# Patient Record
Sex: MALE | Race: WHITE | NOT HISPANIC OR LATINO | Employment: FULL TIME | URBAN - METROPOLITAN AREA
[De-identification: names, ages, dates, MRNs, and addresses within clinical notes are randomized per-mention and may not be internally consistent; named-entity substitution may affect disease eponyms.]

---

## 2018-07-09 ENCOUNTER — OFFICE VISIT CONVERTED (OUTPATIENT)
Dept: OTOLARYNGOLOGY | Facility: CLINIC | Age: 28
End: 2018-07-09
Attending: OTOLARYNGOLOGY

## 2018-08-20 ENCOUNTER — OFFICE VISIT CONVERTED (OUTPATIENT)
Dept: OTOLARYNGOLOGY | Facility: CLINIC | Age: 28
End: 2018-08-20
Attending: OTOLARYNGOLOGY

## 2018-08-20 ENCOUNTER — CONVERSION ENCOUNTER (OUTPATIENT)
Dept: OTOLARYNGOLOGY | Facility: CLINIC | Age: 28
End: 2018-08-20

## 2018-09-18 ENCOUNTER — OFFICE VISIT CONVERTED (OUTPATIENT)
Dept: GASTROENTEROLOGY | Facility: CLINIC | Age: 28
End: 2018-09-18
Attending: NURSE PRACTITIONER

## 2018-09-18 ENCOUNTER — CONVERSION ENCOUNTER (OUTPATIENT)
Dept: GASTROENTEROLOGY | Facility: CLINIC | Age: 28
End: 2018-09-18

## 2019-01-17 ENCOUNTER — HOSPITAL ENCOUNTER (OUTPATIENT)
Dept: OTHER | Facility: HOSPITAL | Age: 29
Discharge: HOME | End: 2019-01-17

## 2019-01-24 ENCOUNTER — HOSPITAL ENCOUNTER (OUTPATIENT)
Dept: GASTROENTEROLOGY | Facility: HOSPITAL | Age: 29
Setting detail: HOSPITAL OUTPATIENT SURGERY
Discharge: HOME | End: 2019-01-24
Attending: INTERNAL MEDICINE

## 2019-01-24 LAB — GLUCOSE BLD-MCNC: 111 MG/DL (ref 70–99)

## 2019-02-07 ENCOUNTER — HOSPITAL ENCOUNTER (OUTPATIENT)
Dept: OTHER | Facility: HOSPITAL | Age: 29
Discharge: HOME | End: 2019-02-07
Attending: INTERNAL MEDICINE

## 2019-02-07 LAB
APPEARANCE UR: CLEAR
BILIRUB UR QL: NEGATIVE
COLOR UR: YELLOW
CONV COLLECTION SOURCE (UA): NORMAL
CONV UROBILINOGEN IN URINE BY AUTOMATED TEST STRIP: 0.2 {EHRLICHU}/DL (ref 0.1–1)
GLUCOSE UR QL: NEGATIVE MG/DL
HGB UR QL STRIP: NEGATIVE
KETONES UR QL STRIP: NEGATIVE MG/DL
LEUKOCYTE ESTERASE UR QL STRIP: NEGATIVE
NITRITE UR QL STRIP: NEGATIVE
PH UR STRIP.AUTO: 6.5 [PH] (ref 5–8)
PROT UR QL: NEGATIVE MG/DL
SP GR UR: 1.02 (ref 1–1.03)

## 2019-02-10 LAB
CONV CELIAC DISEASE AB-IGA: 230 MG/DL (ref 68–408)
TTG IGA SER-ACNC: 0 U/ML (ref 0–3)

## 2019-02-12 ENCOUNTER — OFFICE VISIT CONVERTED (OUTPATIENT)
Dept: GASTROENTEROLOGY | Facility: CLINIC | Age: 29
End: 2019-02-12
Attending: NURSE PRACTITIONER

## 2019-02-12 ENCOUNTER — CONVERSION ENCOUNTER (OUTPATIENT)
Dept: GASTROENTEROLOGY | Facility: CLINIC | Age: 29
End: 2019-02-12

## 2019-03-01 ENCOUNTER — HOSPITAL ENCOUNTER (OUTPATIENT)
Dept: OTHER | Facility: HOSPITAL | Age: 29
Discharge: HOME | End: 2019-03-01

## 2019-03-01 LAB
ALBUMIN SERPL-MCNC: 4.3 G/DL (ref 3.5–5)
ALBUMIN/GLOB SERPL: 1.3 {RATIO} (ref 1.4–2.6)
ALP SERPL-CCNC: 22 U/L (ref 53–128)
ALT SERPL-CCNC: 24 U/L (ref 10–40)
ANION GAP SERPL CALC-SCNC: 17 MMOL/L (ref 8–19)
APPEARANCE UR: CLEAR
AST SERPL-CCNC: 14 U/L (ref 15–50)
BASOPHILS # BLD AUTO: 0.04 10*3/UL (ref 0–0.2)
BASOPHILS NFR BLD AUTO: 0.5 % (ref 0–3)
BILIRUB SERPL-MCNC: 0.42 MG/DL (ref 0.2–1.3)
BILIRUB UR QL: NEGATIVE
BUN SERPL-MCNC: 14 MG/DL (ref 5–25)
BUN/CREAT SERPL: 13 {RATIO} (ref 6–20)
CALCIUM SERPL-MCNC: 9.7 MG/DL (ref 8.7–10.4)
CHLORIDE SERPL-SCNC: 103 MMOL/L (ref 99–111)
CHOLEST SERPL-MCNC: 136 MG/DL (ref 107–200)
CHOLEST/HDLC SERPL: 3.6 {RATIO} (ref 3–6)
COLOR UR: YELLOW
CONV ABS IMM GRAN: 0.03 10*3/UL (ref 0–0.2)
CONV CO2: 25 MMOL/L (ref 22–32)
CONV COLLECTION SOURCE (UA): NORMAL
CONV CREATININE URINE, RANDOM: 166.9 MG/DL (ref 10–300)
CONV IMMATURE GRAN: 0.4 % (ref 0–1.8)
CONV MICROALBUM.,U,RANDOM: <12 MG/L (ref 0–20)
CONV TOTAL PROTEIN: 7.6 G/DL (ref 6.3–8.2)
CONV UROBILINOGEN IN URINE BY AUTOMATED TEST STRIP: 0.2 {EHRLICHU}/DL (ref 0.1–1)
CREAT UR-MCNC: 1.09 MG/DL (ref 0.7–1.2)
DEPRECATED RDW RBC AUTO: 42.6 FL (ref 35.1–43.9)
EOSINOPHIL # BLD AUTO: 0.09 10*3/UL (ref 0–0.7)
EOSINOPHIL # BLD AUTO: 1.1 % (ref 0–7)
ERYTHROCYTE [DISTWIDTH] IN BLOOD BY AUTOMATED COUNT: 14.6 % (ref 11.6–14.4)
EST. AVERAGE GLUCOSE BLD GHB EST-MCNC: 111 MG/DL
GFR SERPLBLD BASED ON 1.73 SQ M-ARVRAT: >60 ML/MIN/{1.73_M2}
GLOBULIN UR ELPH-MCNC: 3.3 G/DL (ref 2–3.5)
GLUCOSE SERPL-MCNC: 99 MG/DL (ref 70–99)
GLUCOSE UR QL: NEGATIVE MG/DL
HBA1C MFR BLD: 13.5 G/DL (ref 14–18)
HBA1C MFR BLD: 5.5 % (ref 3.5–5.7)
HCT VFR BLD AUTO: 43.4 % (ref 42–52)
HDLC SERPL-MCNC: 38 MG/DL (ref 40–60)
HGB UR QL STRIP: NEGATIVE
KETONES UR QL STRIP: NEGATIVE MG/DL
LDLC SERPL CALC-MCNC: 80 MG/DL (ref 70–100)
LEUKOCYTE ESTERASE UR QL STRIP: NEGATIVE
LYMPHOCYTES # BLD AUTO: 2.57 10*3/UL (ref 1–5)
MCH RBC QN AUTO: 25 PG (ref 27–31)
MCHC RBC AUTO-ENTMCNC: 31.1 G/DL (ref 33–37)
MCV RBC AUTO: 80.5 FL (ref 80–96)
MICROALBUMIN/CREAT UR: 7.2 MG/G{CRE} (ref 0–25)
MONOCYTES # BLD AUTO: 0.58 10*3/UL (ref 0.2–1.2)
MONOCYTES NFR BLD AUTO: 7 % (ref 3–10)
NEUTROPHILS # BLD AUTO: 4.94 10*3/UL (ref 2–8)
NEUTROPHILS NFR BLD AUTO: 59.8 % (ref 30–85)
NITRITE UR QL STRIP: NEGATIVE
NRBC CBCN: 0 % (ref 0–0.7)
OSMOLALITY SERPL CALC.SUM OF ELEC: 293 MOSM/KG (ref 273–304)
PH UR STRIP.AUTO: 6 [PH] (ref 5–8)
PLATELET # BLD AUTO: 252 10*3/UL (ref 130–400)
PMV BLD AUTO: 9.4 FL (ref 9.4–12.4)
POTASSIUM SERPL-SCNC: 4.3 MMOL/L (ref 3.5–5.3)
PROT UR QL: NEGATIVE MG/DL
RBC # BLD AUTO: 5.39 10*6/UL (ref 4.7–6.1)
SODIUM SERPL-SCNC: 141 MMOL/L (ref 135–147)
SP GR UR: 1.02 (ref 1–1.03)
T4 FREE SERPL-MCNC: 1.1 NG/DL (ref 0.9–1.8)
TRIGL SERPL-MCNC: 89 MG/DL (ref 40–150)
TSH SERPL-ACNC: 1.32 M[IU]/L (ref 0.27–4.2)
VARIANT LYMPHS NFR BLD MANUAL: 31.2 % (ref 20–45)
VLDLC SERPL-MCNC: 18 MG/DL (ref 5–37)
WBC # BLD AUTO: 8.25 10*3/UL (ref 4.8–10.8)

## 2019-05-07 ENCOUNTER — HOSPITAL ENCOUNTER (OUTPATIENT)
Dept: ULTRASOUND IMAGING | Facility: HOSPITAL | Age: 29
Discharge: HOME | End: 2019-05-07

## 2019-07-16 ENCOUNTER — HOSPITAL ENCOUNTER (OUTPATIENT)
Dept: OTHER | Facility: HOSPITAL | Age: 29
Discharge: HOME | End: 2019-07-16

## 2019-07-16 LAB
ALBUMIN SERPL-MCNC: 4.3 G/DL (ref 3.5–5)
ALBUMIN/GLOB SERPL: 1.4 {RATIO} (ref 1.4–2.6)
ALP SERPL-CCNC: 22 U/L (ref 53–128)
ALT SERPL-CCNC: 23 U/L (ref 10–40)
ANION GAP SERPL CALC-SCNC: 14 MMOL/L (ref 8–19)
AST SERPL-CCNC: 15 U/L (ref 15–50)
BASOPHILS # BLD AUTO: 0.04 10*3/UL (ref 0–0.2)
BASOPHILS NFR BLD AUTO: 0.4 % (ref 0–3)
BILIRUB SERPL-MCNC: 0.37 MG/DL (ref 0.2–1.3)
BUN SERPL-MCNC: 13 MG/DL (ref 5–25)
BUN/CREAT SERPL: 10 {RATIO} (ref 6–20)
CALCIUM SERPL-MCNC: 9.2 MG/DL (ref 8.7–10.4)
CHLORIDE SERPL-SCNC: 105 MMOL/L (ref 99–111)
CHOLEST SERPL-MCNC: 137 MG/DL (ref 107–200)
CHOLEST/HDLC SERPL: 4.2 {RATIO} (ref 3–6)
CONV ABS IMM GRAN: 0.04 10*3/UL (ref 0–0.2)
CONV CO2: 26 MMOL/L (ref 22–32)
CONV IMMATURE GRAN: 0.4 % (ref 0–1.8)
CONV TOTAL PROTEIN: 7.3 G/DL (ref 6.3–8.2)
CREAT UR-MCNC: 1.33 MG/DL (ref 0.7–1.2)
DEPRECATED RDW RBC AUTO: 44.1 FL (ref 35.1–43.9)
EOSINOPHIL # BLD AUTO: 0.14 10*3/UL (ref 0–0.7)
EOSINOPHIL # BLD AUTO: 1.6 % (ref 0–7)
ERYTHROCYTE [DISTWIDTH] IN BLOOD BY AUTOMATED COUNT: 14.4 % (ref 11.6–14.4)
EST. AVERAGE GLUCOSE BLD GHB EST-MCNC: 108 MG/DL
FERRITIN SERPL-MCNC: 69 NG/ML (ref 30–300)
FOLATE SERPL-MCNC: 9.5 NG/ML (ref 4.8–20)
GFR SERPLBLD BASED ON 1.73 SQ M-ARVRAT: >60 ML/MIN/{1.73_M2}
GLOBULIN UR ELPH-MCNC: 3 G/DL (ref 2–3.5)
GLUCOSE SERPL-MCNC: 121 MG/DL (ref 70–99)
HBA1C MFR BLD: 13.8 G/DL (ref 14–18)
HBA1C MFR BLD: 5.4 % (ref 3.5–5.7)
HCT VFR BLD AUTO: 44.6 % (ref 42–52)
HDLC SERPL-MCNC: 33 MG/DL (ref 40–60)
IRON SATN MFR SERPL: 12 % (ref 20–55)
IRON SERPL-MCNC: 44 UG/DL (ref 70–180)
LDLC SERPL CALC-MCNC: 78 MG/DL (ref 70–100)
LYMPHOCYTES # BLD AUTO: 3.02 10*3/UL (ref 1–5)
MCH RBC QN AUTO: 26.1 PG (ref 27–31)
MCHC RBC AUTO-ENTMCNC: 30.9 G/DL (ref 33–37)
MCV RBC AUTO: 84.3 FL (ref 80–96)
MONOCYTES # BLD AUTO: 0.73 10*3/UL (ref 0.2–1.2)
MONOCYTES NFR BLD AUTO: 8.2 % (ref 3–10)
NEUTROPHILS # BLD AUTO: 4.94 10*3/UL (ref 2–8)
NEUTROPHILS NFR BLD AUTO: 55.5 % (ref 30–85)
NRBC CBCN: 0 % (ref 0–0.7)
OSMOLALITY SERPL CALC.SUM OF ELEC: 291 MOSM/KG (ref 273–304)
PLATELET # BLD AUTO: 287 10*3/UL (ref 130–400)
PMV BLD AUTO: 9.3 FL (ref 9.4–12.4)
POTASSIUM SERPL-SCNC: 5.1 MMOL/L (ref 3.5–5.3)
RBC # BLD AUTO: 5.29 10*6/UL (ref 4.7–6.1)
RETICS # AUTO: 0.07 10*6/UL (ref 0.03–0.1)
RETICS/RBC NFR AUTO: 1.41 % (ref 0.51–1.81)
SODIUM SERPL-SCNC: 140 MMOL/L (ref 135–147)
TIBC SERPL-MCNC: 366 UG/DL (ref 245–450)
TRANSFERRIN SERPL-MCNC: 256 MG/DL (ref 215–365)
TRIGL SERPL-MCNC: 130 MG/DL (ref 40–150)
VARIANT LYMPHS NFR BLD MANUAL: 33.9 % (ref 20–45)
VIT B12 SERPL-MCNC: 523 PG/ML (ref 211–911)
VLDLC SERPL-MCNC: 26 MG/DL (ref 5–37)
WBC # BLD AUTO: 8.91 10*3/UL (ref 4.8–10.8)

## 2019-07-22 ENCOUNTER — HOSPITAL ENCOUNTER (OUTPATIENT)
Dept: ULTRASOUND IMAGING | Facility: HOSPITAL | Age: 29
Discharge: HOME | End: 2019-07-22

## 2019-10-16 ENCOUNTER — HOSPITAL ENCOUNTER (OUTPATIENT)
Dept: OTHER | Facility: HOSPITAL | Age: 29
Discharge: HOME | End: 2019-10-16

## 2019-10-16 LAB
ALBUMIN SERPL-MCNC: 4.2 G/DL (ref 3.5–5)
ALBUMIN/GLOB SERPL: 1.5 {RATIO} (ref 1.4–2.6)
ALP SERPL-CCNC: 21 U/L (ref 53–128)
ALT SERPL-CCNC: 23 U/L (ref 10–40)
ANION GAP SERPL CALC-SCNC: 18 MMOL/L (ref 8–19)
AST SERPL-CCNC: 14 U/L (ref 15–50)
BASOPHILS # BLD AUTO: 0.06 10*3/UL (ref 0–0.2)
BASOPHILS NFR BLD AUTO: 0.8 % (ref 0–3)
BILIRUB SERPL-MCNC: 0.29 MG/DL (ref 0.2–1.3)
BUN SERPL-MCNC: 14 MG/DL (ref 5–25)
BUN/CREAT SERPL: 14 {RATIO} (ref 6–20)
CALCIUM SERPL-MCNC: 9.6 MG/DL (ref 8.7–10.4)
CHLORIDE SERPL-SCNC: 105 MMOL/L (ref 99–111)
CHOLEST SERPL-MCNC: 127 MG/DL (ref 107–200)
CHOLEST/HDLC SERPL: 3.3 {RATIO} (ref 3–6)
CONV ABS IMM GRAN: 0.04 10*3/UL (ref 0–0.2)
CONV CO2: 24 MMOL/L (ref 22–32)
CONV IMMATURE GRAN: 0.5 % (ref 0–1.8)
CONV TOTAL PROTEIN: 7 G/DL (ref 6.3–8.2)
CREAT UR-MCNC: 1.03 MG/DL (ref 0.7–1.2)
DEPRECATED RDW RBC AUTO: 42.8 FL (ref 35.1–43.9)
EOSINOPHIL # BLD AUTO: 0.13 10*3/UL (ref 0–0.7)
EOSINOPHIL # BLD AUTO: 1.6 % (ref 0–7)
ERYTHROCYTE [DISTWIDTH] IN BLOOD BY AUTOMATED COUNT: 13.8 % (ref 11.6–14.4)
EST. AVERAGE GLUCOSE BLD GHB EST-MCNC: 103 MG/DL
FERRITIN SERPL-MCNC: 57 NG/ML (ref 30–300)
FOLATE SERPL-MCNC: 9.8 NG/ML (ref 4.8–20)
GFR SERPLBLD BASED ON 1.73 SQ M-ARVRAT: >60 ML/MIN/{1.73_M2}
GLOBULIN UR ELPH-MCNC: 2.8 G/DL (ref 2–3.5)
GLUCOSE SERPL-MCNC: 95 MG/DL (ref 70–99)
HBA1C MFR BLD: 5.2 % (ref 3.5–5.7)
HCT VFR BLD AUTO: 43.5 % (ref 42–52)
HDLC SERPL-MCNC: 38 MG/DL (ref 40–60)
HGB BLD-MCNC: 13.5 G/DL (ref 14–18)
IRON SATN MFR SERPL: 16 % (ref 20–55)
IRON SERPL-MCNC: 55 UG/DL (ref 70–180)
LDLC SERPL CALC-MCNC: 63 MG/DL (ref 70–100)
LYMPHOCYTES # BLD AUTO: 2.89 10*3/UL (ref 1–5)
LYMPHOCYTES NFR BLD AUTO: 36.4 % (ref 20–45)
MCH RBC QN AUTO: 26.4 PG (ref 27–31)
MCHC RBC AUTO-ENTMCNC: 31 G/DL (ref 33–37)
MCV RBC AUTO: 85 FL (ref 80–96)
MONOCYTES # BLD AUTO: 0.7 10*3/UL (ref 0.2–1.2)
MONOCYTES NFR BLD AUTO: 8.8 % (ref 3–10)
NEUTROPHILS # BLD AUTO: 4.12 10*3/UL (ref 2–8)
NEUTROPHILS NFR BLD AUTO: 51.9 % (ref 30–85)
NRBC CBCN: 0 % (ref 0–0.7)
OSMOLALITY SERPL CALC.SUM OF ELEC: 294 MOSM/KG (ref 273–304)
PLATELET # BLD AUTO: 252 10*3/UL (ref 130–400)
PMV BLD AUTO: 9.7 FL (ref 9.4–12.4)
POTASSIUM SERPL-SCNC: 4.8 MMOL/L (ref 3.5–5.3)
RBC # BLD AUTO: 5.12 10*6/UL (ref 4.7–6.1)
RETICS # AUTO: 0.07 10*6/UL (ref 0.03–0.1)
RETICS/RBC NFR AUTO: 1.45 % (ref 0.51–1.81)
SODIUM SERPL-SCNC: 142 MMOL/L (ref 135–147)
TIBC SERPL-MCNC: 336 UG/DL (ref 245–450)
TRANSFERRIN SERPL-MCNC: 235 MG/DL (ref 215–365)
TRIGL SERPL-MCNC: 132 MG/DL (ref 40–150)
VIT B12 SERPL-MCNC: 600 PG/ML (ref 211–911)
VLDLC SERPL-MCNC: 26 MG/DL (ref 5–37)
WBC # BLD AUTO: 7.94 10*3/UL (ref 4.8–10.8)

## 2019-12-11 ENCOUNTER — HOSPITAL ENCOUNTER (OUTPATIENT)
Dept: MRI IMAGING | Facility: HOSPITAL | Age: 29
Discharge: HOME | End: 2019-12-11

## 2020-01-27 ENCOUNTER — CONVERSION ENCOUNTER (OUTPATIENT)
Dept: NEUROLOGY | Facility: CLINIC | Age: 30
End: 2020-01-27

## 2020-01-27 ENCOUNTER — OFFICE VISIT CONVERTED (OUTPATIENT)
Dept: NEUROSURGERY | Facility: CLINIC | Age: 30
End: 2020-01-27
Attending: PHYSICIAN ASSISTANT

## 2020-07-23 ENCOUNTER — HOSPITAL ENCOUNTER (OUTPATIENT)
Dept: URGENT CARE | Facility: CLINIC | Age: 30
Discharge: HOME | End: 2020-07-23
Attending: NURSE PRACTITIONER

## 2020-07-25 LAB — BACTERIA SPEC AEROBE CULT: NORMAL

## 2021-05-15 VITALS — BODY MASS INDEX: 40.43 KG/M2 | HEART RATE: 76 BPM | HEIGHT: 74 IN | WEIGHT: 315 LBS

## 2021-05-16 VITALS
BODY MASS INDEX: 40.43 KG/M2 | OXYGEN SATURATION: 98 % | TEMPERATURE: 98 F | RESPIRATION RATE: 18 BRPM | WEIGHT: 315 LBS | HEIGHT: 74 IN | HEART RATE: 94 BPM

## 2021-05-16 VITALS — HEIGHT: 74 IN | BODY MASS INDEX: 40.43 KG/M2 | TEMPERATURE: 98.2 F | WEIGHT: 315 LBS

## 2021-05-16 VITALS
TEMPERATURE: 97.8 F | HEIGHT: 74 IN | WEIGHT: 315 LBS | SYSTOLIC BLOOD PRESSURE: 116 MMHG | HEART RATE: 79 BPM | BODY MASS INDEX: 40.43 KG/M2 | DIASTOLIC BLOOD PRESSURE: 68 MMHG

## 2021-05-16 VITALS
DIASTOLIC BLOOD PRESSURE: 83 MMHG | HEIGHT: 74 IN | SYSTOLIC BLOOD PRESSURE: 132 MMHG | WEIGHT: 315 LBS | BODY MASS INDEX: 40.43 KG/M2

## 2021-10-10 ENCOUNTER — APPOINTMENT (OUTPATIENT)
Dept: CT IMAGING | Facility: HOSPITAL | Age: 31
End: 2021-10-10

## 2021-10-10 ENCOUNTER — APPOINTMENT (OUTPATIENT)
Dept: ULTRASOUND IMAGING | Facility: HOSPITAL | Age: 31
End: 2021-10-10

## 2021-10-10 ENCOUNTER — HOSPITAL ENCOUNTER (EMERGENCY)
Facility: HOSPITAL | Age: 31
Discharge: HOME | End: 2021-10-10
Attending: EMERGENCY MEDICINE | Admitting: EMERGENCY MEDICINE

## 2021-10-10 VITALS
DIASTOLIC BLOOD PRESSURE: 54 MMHG | OXYGEN SATURATION: 98 % | SYSTOLIC BLOOD PRESSURE: 101 MMHG | HEIGHT: 74 IN | RESPIRATION RATE: 18 BRPM | BODY MASS INDEX: 40.43 KG/M2 | TEMPERATURE: 98.8 F | WEIGHT: 315 LBS | HEART RATE: 80 BPM

## 2021-10-10 DIAGNOSIS — G89.29 CHRONIC LEFT-SIDED LOW BACK PAIN WITHOUT SCIATICA: Primary | ICD-10-CM

## 2021-10-10 DIAGNOSIS — M54.50 CHRONIC LEFT-SIDED LOW BACK PAIN WITHOUT SCIATICA: Primary | ICD-10-CM

## 2021-10-10 LAB
BILIRUB UR QL STRIP: NEGATIVE
CLARITY UR: CLEAR
COLOR UR: YELLOW
GLUCOSE UR STRIP-MCNC: NEGATIVE MG/DL
HGB UR QL STRIP.AUTO: NEGATIVE
KETONES UR QL STRIP: NEGATIVE
LEUKOCYTE ESTERASE UR QL STRIP.AUTO: NEGATIVE
NITRITE UR QL STRIP: NEGATIVE
PH UR STRIP.AUTO: 5.5 [PH] (ref 5–8)
PROT UR QL STRIP: NEGATIVE
SP GR UR STRIP: >1.03 (ref 1–1.03)
UROBILINOGEN UR QL STRIP: ABNORMAL

## 2021-10-10 PROCEDURE — 81003 URINALYSIS AUTO W/O SCOPE: CPT | Performed by: EMERGENCY MEDICINE

## 2021-10-10 PROCEDURE — 74176 CT ABD & PELVIS W/O CONTRAST: CPT

## 2021-10-10 PROCEDURE — 76870 US EXAM SCROTUM: CPT

## 2021-10-10 PROCEDURE — 99283 EMERGENCY DEPT VISIT LOW MDM: CPT

## 2021-10-10 NOTE — ED PROVIDER NOTES
Time: 7:32 PM EDT  Arrived by: private car  Chief Complaint: GROIN PAIN   History provided by: pt   History is limited by: N/A     History of Present Illness:  Patient is a 31 y.o. year old male that presents to the emergency department with left GROIN PAIN. This started a couple days ago and is still present and constant. Pain is rated a 6.5 out of 10 currently by the pt. Nothing improves or worsens symptoms.     No testicular edema, erythema, or tenderness. Pt denies weakness and fever. He does c/o an increase in his chronic lower back pain. He had a recent epidural injection. No bladder or bowel incontinecne. Pt is unsure if the back pain is radiating to the groin.     History provided by:  Patient    Recently seen: not recently seen in this ED     Patient Care Team  Primary Care Provider: Carlos Law MD    Past Medical History:     Allergies   Allergen Reactions   • Morphine Hives   • Penicillins Hives     Past Medical History:   Diagnosis Date   • Diabetes mellitus (HCC)    • GERD (gastroesophageal reflux disease)    • Hypertension      Past Surgical History:   Procedure Laterality Date   • APPENDECTOMY     • FEMUR SURGERY     • NASAL SEPTUM SURGERY     • TONSILLECTOMY       History reviewed. No pertinent family history.    Home Medications:  Prior to Admission medications    Medication Sig Start Date End Date Taking? Authorizing Provider   albuterol sulfate  (90 Base) MCG/ACT inhaler Inhale 2 puffs Every 4 (Four) Hours As Needed for Wheezing. 6/26/21   Christopher Gardner PA   brompheniramine-pseudoephedrine-DM 30-2-10 MG/5ML syrup Take 10 mL by mouth 4 (Four) Times a Day As Needed for Congestion, Cough or Allergies. 6/26/21   Christopher Gardner PA   Cetirizine HCl (ZyrTEC Allergy) 10 MG capsule Zyrtec    Emergency, Nurse Sergio, RN   exenatide er (Bydureon BCise) 2 MG/0.85ML auto-injector injection Bydureon BCise 2 mg/0.85 mL subcutaneous auto-injector    Emergency, Nurse Sergio, RN   fluticasone (FLONASE)  "50 MCG/ACT nasal spray 2 sprays into the nostril(s) as directed by provider Daily. 6/26/21   Christopher Gardner PA   lisinopril (PRINIVIL,ZESTRIL) 10 MG tablet lisinopril 10 mg tablet   TAKE 1 TABLET BY MOUTH ONCE DAILY FOR 30 DAYS    Emergency, Nurse Epic, RN   metFORMIN ER (GLUCOPHAGE-XR) 500 MG 24 hr tablet metformin  mg tablet,extended release 24 hr    Emergency, Nurse Sergio RN   multivitamin (THERAGRAN) tablet tablet multivitamin oral tablet take 1 tablet by oral route daily   Active    Emergency, Nurse Sergio RN   omeprazole (priLOSEC) 20 MG capsule     Emergency, Nurse Sergio RN   omeprazole (priLOSEC) 20 MG capsule omeprazole 20 mg capsule,delayed release   TAKE 1 CAPSULE BY MOUTH ONCE DAILY FOR 30 DAYS    Emergency, Nurse Epic, RN   omeprazole OTC (PriLOSEC OTC) 20 MG EC tablet omeprazole 20 mg oral tablet,delayed release (DR/EC) take 1 tablet by oral route daily   Active    Emergency, Nurse AMADOR Hill        Social History:   Social History     Tobacco Use   • Smoking status: Never Smoker   • Smokeless tobacco: Current User     Types: Chew   Vaping Use   • Vaping Use: Never used   Substance Use Topics   • Alcohol use: Yes     Comment: occ   • Drug use: Never     Recent travel: no     Review of Systems:  Review of Systems   Constitutional: Negative for chills, diaphoresis and fever.   HENT: Negative for ear discharge and nosebleeds.    Eyes: Negative for photophobia.   Respiratory: Negative for shortness of breath.    Cardiovascular: Negative for chest pain.   Gastrointestinal: Negative for diarrhea, nausea and vomiting.   Genitourinary: Negative for dysuria.        Groin pain.    Musculoskeletal: Positive for back pain (low back). Negative for neck pain.   Skin: Negative for rash.   Neurological: Negative for headaches.        Physical Exam:  /54 (BP Location: Left arm, Patient Position: Lying)   Pulse 80   Temp 98.8 °F (37.1 °C) (Oral)   Resp 18   Ht 188 cm (74\")   Wt (!) 145 kg (319 lb 3.6 " oz)   SpO2 98%   BMI 40.99 kg/m²     Physical Exam  Vitals and nursing note reviewed.   Constitutional:       General: He is not in acute distress.     Appearance: Normal appearance.   HENT:      Head: Normocephalic and atraumatic.      Nose: Nose normal.   Eyes:      General: No scleral icterus.  Cardiovascular:      Rate and Rhythm: Normal rate and regular rhythm.      Heart sounds: Normal heart sounds.   Pulmonary:      Effort: Pulmonary effort is normal. No respiratory distress.      Breath sounds: Normal breath sounds.   Abdominal:      Palpations: Abdomen is soft.      Tenderness: There is no abdominal tenderness.      Hernia: There is no hernia in the left inguinal area or right inguinal area.   Genitourinary:     Penis: Circumcised.       Testes:         Right: Swelling not present.         Left: Tenderness or swelling not present.   Musculoskeletal:         General: Normal range of motion.      Cervical back: Neck supple.      Right lower leg: No edema.      Left lower leg: No edema.   Skin:     General: Skin is warm and dry.   Neurological:      General: No focal deficit present.      Mental Status: He is alert and oriented to person, place, and time.      Sensory: No sensory deficit.      Motor: No weakness.                Medications in the Emergency Department:  Medications - No data to display     Labs  Lab Results (last 24 hours)     Procedure Component Value Units Date/Time    Urinalysis With Microscopic If Indicated (No Culture) - Urine, Clean Catch [[id number]]  (Abnormal) Collected: 10/10/21 1852    Specimen: Urine, Clean Catch Updated: 10/10/21 1910     Color, UA Yellow     Appearance, UA Clear     pH, UA 5.5     Specific Gravity, UA >1.030     Glucose, UA Negative     Ketones, UA Negative     Bilirubin, UA Negative     Blood, UA Negative     Protein, UA Negative     Leuk Esterase, UA Negative     Nitrite, UA Negative     Urobilinogen, UA 1.0 E.U./dL    Narrative:      Urine microscopic not  indicated.           Imaging:  CT Abdomen Pelvis Without Contrast    Result Date: 10/10/2021  PROCEDURE: CT ABDOMEN PELVIS WO CONTRAST  COMPARISON: [facility], CT, ABDMEN/PELVIS WITH CONTRAST, 12/29/2012, 9:34.  INDICATIONS: Left flank and groin pain  TECHNIQUE: CT images were created without intravenous contrast.   PROTOCOL:   Standard imaging protocol performed    RADIATION:   DLP: 1426.7 mGy*cm   Automated exposure control was utilized to minimize radiation dose.  FINDINGS:  Lung bases are without consolidation.  No pericardial effusion or pleural effusion.  Lack of contrast limits assessment of abdominal organs and vasculature.  The liver and spleen are normal in size and contour.  No pericholecystic inflammation.  Normal adrenal glands.  Pancreas without findings of pancreatitis.  The kidneys are symmetric in size.  There is no obstructing renal or ureteral calculus.  Urinary bladder is thin-walled.  Prostate normal in size.  Negative for pneumoperitoneum.  No bowel obstruction.  Scattered colonic diverticulosis without diverticulitis.  Appendix appears absent.  Abdominal aorta without aneurysm.  No aggressive osseous lesion or acute fracture.  Facet arthropathy in the lower lumbar spine at L4-5 and L5-S1.  Evidence of prior intramedullary nail fixation of the right proximal femur status post removal.  Degenerative findings in the lumbar spine at L3-4, L4-5 and L5-S1 better assessed on prior MRI.  CONCLUSION:  1. No acute abnormality in the abdomen or pelvis. 2. Chronic findings above.     CRISTINO MARRERO MD       Electronically Signed and Approved By: CRISTINO MARRERO MD on 10/10/2021 at 21:37             US Scrotum & Testicles    Result Date: 10/10/2021  PROCEDURE: US SCROTUM AND TESTICLES  COMPARISON: None  INDICATIONS: Testicular/Scrotal Pain  TECHNIQUE: The scrotum and testicles were evaluated with gray scale and color duplex Doppler sonography.   FINDINGS:  The right testicle measures 2.6 x 3.6 x  5.1 cm.  There is normal Doppler flow in the right testicle.  No testicular mass on the right.  The right epididymal head measures 1.3 cm.  The left testicle measures 2.5 x 3.4 x 4.6 cm.  Normal Doppler flow in the left testicle.  No testicular mass in the left.  The left epididymal head measures 1.2 cm.  No significant hydrocele.  No varicocele.   CONCLUSION:  1. Negative for torsion or testicular mass. 2. No acute sonographic abnormality.      CRISTINO MARRERO MD       Electronically Signed and Approved By: CRISTINO MARRERO MD on 10/10/2021 at 20:10               Procedures:  Procedures    Progress                            Medical Decision Making:  MDM  Number of Diagnoses or Management Options     Amount and/or Complexity of Data Reviewed  Clinical lab tests: reviewed  Tests in the radiology section of CPT®: reviewed  Independent visualization of images, tracings, or specimens: yes    Risk of Complications, Morbidity, and/or Mortality  Presenting problems: moderate  Management options: low         Final diagnoses:   Chronic left-sided low back pain without sciatica        Disposition:  ED Disposition     ED Disposition Condition Comment    Discharge Stable           This medical record created using voice recognition software and may contain unintended errors.    Documentation assistance provided by Dary Reis acting as scribe for Maxwell Gillis MD. Information recorded by the scribe was done at my direction and has been verified and validated by me.        Dary Reis  10/10/21 1940       Maxwell Gillis MD  10/10/21 3261

## 2021-10-11 NOTE — DISCHARGE INSTRUCTIONS
As we discussed, return to the emergency department for worsening pain, fever, lower extremity weakness or loss of sensation, loss of control of your bowel or bladder.

## 2022-02-07 ENCOUNTER — OFFICE VISIT (OUTPATIENT)
Dept: FAMILY MEDICINE CLINIC | Facility: CLINIC | Age: 32
End: 2022-02-07

## 2022-02-07 ENCOUNTER — OFFICE VISIT (OUTPATIENT)
Dept: UROLOGY | Facility: CLINIC | Age: 32
End: 2022-02-07

## 2022-02-07 VITALS
WEIGHT: 315 LBS | DIASTOLIC BLOOD PRESSURE: 59 MMHG | BODY MASS INDEX: 40.43 KG/M2 | SYSTOLIC BLOOD PRESSURE: 138 MMHG | TEMPERATURE: 98.4 F | HEIGHT: 74 IN | HEART RATE: 118 BPM

## 2022-02-07 VITALS
OXYGEN SATURATION: 97 % | SYSTOLIC BLOOD PRESSURE: 122 MMHG | DIASTOLIC BLOOD PRESSURE: 69 MMHG | BODY MASS INDEX: 40.43 KG/M2 | HEART RATE: 97 BPM | HEIGHT: 74 IN | WEIGHT: 315 LBS

## 2022-02-07 DIAGNOSIS — R14.2 BELCHING: ICD-10-CM

## 2022-02-07 DIAGNOSIS — J30.9 ALLERGIC RHINITIS, UNSPECIFIED SEASONALITY, UNSPECIFIED TRIGGER: ICD-10-CM

## 2022-02-07 DIAGNOSIS — Z13.220 LIPID SCREENING: ICD-10-CM

## 2022-02-07 DIAGNOSIS — Z76.89 ESTABLISHING CARE WITH NEW DOCTOR, ENCOUNTER FOR: Primary | ICD-10-CM

## 2022-02-07 DIAGNOSIS — Z30.09 STERILIZATION CONSULT: Primary | ICD-10-CM

## 2022-02-07 DIAGNOSIS — Z13.29 SCREENING FOR THYROID DISORDER: ICD-10-CM

## 2022-02-07 DIAGNOSIS — Z11.59 NEED FOR HEPATITIS C SCREENING TEST: ICD-10-CM

## 2022-02-07 DIAGNOSIS — R10.13 EPIGASTRIC PAIN: ICD-10-CM

## 2022-02-07 DIAGNOSIS — E11.9 TYPE 2 DIABETES MELLITUS WITHOUT COMPLICATION, WITHOUT LONG-TERM CURRENT USE OF INSULIN: ICD-10-CM

## 2022-02-07 DIAGNOSIS — I10 ESSENTIAL HYPERTENSION: ICD-10-CM

## 2022-02-07 DIAGNOSIS — K21.9 GASTROESOPHAGEAL REFLUX DISEASE WITHOUT ESOPHAGITIS: ICD-10-CM

## 2022-02-07 LAB
BILIRUB BLD-MCNC: NEGATIVE MG/DL
CLARITY, POC: CLEAR
COLOR UR: YELLOW
GLUCOSE UR STRIP-MCNC: NEGATIVE MG/DL
KETONES UR QL: NEGATIVE
LEUKOCYTE EST, POC: NEGATIVE
NITRITE UR-MCNC: NEGATIVE MG/ML
PH UR: 5.5 [PH] (ref 5–8)
PROT UR STRIP-MCNC: NEGATIVE MG/DL
RBC # UR STRIP: NEGATIVE /UL
SP GR UR: 1.02 (ref 1–1.03)
UROBILINOGEN UR QL: NORMAL

## 2022-02-07 PROCEDURE — 99212 OFFICE O/P EST SF 10 MIN: CPT | Performed by: NURSE PRACTITIONER

## 2022-02-07 PROCEDURE — 99204 OFFICE O/P NEW MOD 45 MIN: CPT | Performed by: NURSE PRACTITIONER

## 2022-02-07 PROCEDURE — 81002 URINALYSIS NONAUTO W/O SCOPE: CPT | Performed by: NURSE PRACTITIONER

## 2022-02-07 RX ORDER — OMEPRAZOLE 20 MG/1
20 CAPSULE, DELAYED RELEASE ORAL DAILY
COMMUNITY
Start: 2022-01-29 | End: 2022-02-22 | Stop reason: SDUPTHER

## 2022-02-07 RX ORDER — ALUMINUM CHLORIDE 20 %
SOLUTION, NON-ORAL TOPICAL
COMMUNITY
Start: 2022-01-23 | End: 2022-03-11

## 2022-02-07 RX ORDER — FAMOTIDINE 20 MG/1
20 TABLET, FILM COATED ORAL DAILY PRN
COMMUNITY
Start: 2022-01-24 | End: 2022-03-11

## 2022-02-07 RX ORDER — LEVOCETIRIZINE DIHYDROCHLORIDE 5 MG/1
5 TABLET, FILM COATED ORAL EVERY EVENING
COMMUNITY
End: 2022-02-07

## 2022-02-07 NOTE — PROGRESS NOTES
Chief Complaint  Establish care  Diabetes, GERD, Allergic rhinitis  Subjective          Abdiel Rico Terrell presents to [facility]  History of Present Illness    Pt is c/o significant belching and burping since December. He started the Ozempic in October and the symptoms started in December. Pt states he is experiencing a lot of gas and epigastric pain. Pt does c/o nausea, worse after meals. Pt states he had CT scan a couple weeks ago which was normal. Pt does take omeprazole daily and was recently given famotidine as an add on medication and states that has helped. He took for a couple days and then his symptoms subsided and his symptoms have much improved.  He states he had a CAT scan done of his abdomen and pelvis about 2 weeks ago at [facility].  We have called to obtain that report.  He states his previous provider told him that everything was normal.  That is the only recent imaging he has had regarding this.  He does see Dr. Alston and has an appointment with him in March.  He does have a known hiatal hernia from a prior EGD.    Diabetes-patient currently on Ozempic and Metformin.  His last A1c in October was 6.1.    Pretension-currently on lisinopril 10 mg and doing well with this, blood pressures well controlled.  Patient denies any headache dizziness edema.    Allergic rhinitis-patient currently on Xyzal daily with good control of allergy symptoms.    Past Medical History:   Diagnosis Date   • Diabetes mellitus (HCC)    • GERD (gastroesophageal reflux disease)    • Hypertension          Allergies   Allergen Reactions   • Morphine Hives   • Penicillins Hives          Past Surgical History:   Procedure Laterality Date   • APPENDECTOMY     • FEMUR SURGERY     • NASAL SEPTUM SURGERY     • TONSILLECTOMY            Social History     Tobacco Use   • Smoking status: Former Smoker     Packs/day: 0.50     Types: Cigarettes     Start date: 2004     Quit date: 2/1/2017     Years  since quittin.0   • Smokeless tobacco: Current User     Types: Chew   Substance Use Topics   • Alcohol use: Yes     Comment: occ         History reviewed. No pertinent family history.       Current Outpatient Medications on File Prior to Visit   Medication Sig   • albuterol sulfate  (90 Base) MCG/ACT inhaler Inhale 2 puffs Every 4 (Four) Hours As Needed for Wheezing.   • Drysol 20 % external solution APPLY SOLUTION ONCE DAILY AT BEDTIME FOR 30 DAYS   • famotidine (PEPCID) 20 MG tablet Take 20 mg by mouth Daily As Needed.   • levocetirizine (XYZAL) 5 MG tablet Take 5 mg by mouth Every Evening.   • lisinopril (PRINIVIL,ZESTRIL) 10 MG tablet lisinopril 10 mg tablet   TAKE 1 TABLET BY MOUTH ONCE DAILY FOR 30 DAYS   • metFORMIN ER (GLUCOPHAGE-XR) 500 MG 24 hr tablet Take 500 mg by mouth 2 (Two) Times a Day.   • multivitamin (THERAGRAN) tablet tablet multivitamin oral tablet take 1 tablet by oral route daily   Active   • omeprazole (priLOSEC) 20 MG capsule Take 20 mg by mouth Daily.   • Semaglutide,0.25 or 0.5MG/DOS, (Ozempic, 0.25 or 0.5 MG/DOSE,) 2 MG/1.5ML solution pen-injector Ozempic 0.25 mg or 0.5 mg (2 mg/1.5 mL) subcutaneous pen injector   • [DISCONTINUED] Cetirizine HCl (ZyrTEC Allergy) 10 MG capsule Zyrtec   • [DISCONTINUED] omeprazole OTC (PriLOSEC OTC) 20 MG EC tablet omeprazole 20 mg oral tablet,delayed release (DR/EC) take 1 tablet by oral route daily   Active     No current facility-administered medications on file prior to visit.         Immunization History   Administered Date(s) Administered   • COVID-19 (PFIZER) PURPLE CAP 2021, 2021   • Flu Vaccine Quad PF >36MO 2017, 2018, 10/12/2019   • Flublok 18+yrs 10/12/2020   • Hepatitis A 2018, 2018   • Influenza Quad Vaccine (Inpatient) 10/02/2016   • MMR 2000   • Pneumococcal Polysaccharide (PPSV23) 2017   • Td 2003   • Tdap 10/04/2016   • flucelvax quad pfs =>4 YRS 2021         /69   " Pulse 97   Ht 188 cm (74\")   Wt (!) 145 kg (320 lb 6.4 oz)   SpO2 97%   BMI 41.14 kg/m²             Physical Exam  Vitals reviewed.   Constitutional:       Appearance: Normal appearance. He is well-developed.   HENT:      Head: Normocephalic and atraumatic.      Right Ear: External ear normal.      Left Ear: External ear normal.      Mouth/Throat:      Pharynx: No oropharyngeal exudate.   Eyes:      Conjunctiva/sclera: Conjunctivae normal.      Pupils: Pupils are equal, round, and reactive to light.   Cardiovascular:      Rate and Rhythm: Normal rate and regular rhythm.      Heart sounds: No murmur heard.  No friction rub. No gallop.    Pulmonary:      Effort: Pulmonary effort is normal.      Breath sounds: Normal breath sounds. No wheezing or rhonchi.   Abdominal:      Tenderness: There is abdominal tenderness in the epigastric area.   Skin:     General: Skin is warm and dry.   Neurological:      Mental Status: He is alert and oriented to person, place, and time.      Cranial Nerves: No cranial nerve deficit.   Psychiatric:         Mood and Affect: Mood and affect normal.         Behavior: Behavior normal.         Thought Content: Thought content normal.         Judgment: Judgment normal.             Result Review :                           Assessment and Plan      Diagnoses and all orders for this visit:    1. Establishing care with new doctor, encounter for (Primary)    2. Need for hepatitis C screening test  -     Hepatitis C antibody; Future    3. Type 2 diabetes mellitus without complication, without long-term current use of insulin (HCC)  Comments:  Well-controlled, continue current medications.  Orders:  -     Comprehensive Metabolic Panel; Future  -     Hemoglobin A1c; Future  -     Microalbumin / Creatinine Urine Ratio - Urine, Clean Catch; Future    4. Essential hypertension  Comments:  Well-controlled, continue daily lisinopril.    5. Gastroesophageal reflux disease without " esophagitis  Comments:  Continue omeprazole daily we will check DISIDA scan.  Orders:  -     NM HIDA Scan With Pharmacological Intervention; Future    6. Belching  -     Helicobacter Pylori, IgA IgG IgM; Future  -     Amylase; Future  -     Lipase; Future  -     CBC w AUTO Differential; Future  -     NM HIDA Scan With Pharmacological Intervention; Future    7. Epigastric pain  -     Helicobacter Pylori, IgA IgG IgM; Future  -     Amylase; Future  -     Lipase; Future  -     CBC w AUTO Differential; Future  -     NM HIDA Scan With Pharmacological Intervention; Future    8. Lipid screening  -     Lipid Panel; Future    9. Screening for thyroid disorder  -     TSH; Future    10. Allergic rhinitis, unspecified seasonality, unspecified trigger  Comments:  Symptoms controlled with daily Xyzal, continue current medication.              Follow Up     Return in about 3 months (around 5/7/2022), or if symptoms worsen or fail to improve.    Patient was given instructions and counseling regarding his condition or for health maintenance advice. Please see specific information pulled into the AVS if appropriate.

## 2022-02-07 NOTE — PROGRESS NOTES
"Chief Complaint  Sterilization (consult )    Subjective          Abdiel Terrell 31 y.o.male presents to [facility]  History of Present Illness  The patient is a consultation from self, for vasectomy counseling. Prior  surgeries have not been performed .He  and has 1 biological child with his spouse. They do not want anymore children.     The patient is counseled regarding a no scalpel vasectomy. Key points are that it should be considered irreversible even though it can be reversed, there are risks including failure to achieve sterility, recanalization, bleeding, testicular swelling and/or pain, formation of a sperm granuloma and infection. Limitations of activity post-procedure were discussed and he understands that he is not sterile immediately following the procedure. He will need to bring a semen specimen back in about 6-8 weeks to confirm the abscence of sperm and needs to use contraception until then. Patient understands this is considered an irreversible procedure and wishes have performed. Denies any urological problems or bleeding disorders.     Sruthi Pelayo, APRN  02/07/2022       Review of Systems   Constitutional: Negative for chills and fever.   Respiratory: Negative for cough.    Genitourinary: Negative for difficulty urinating and testicular pain.   Musculoskeletal: Negative for back pain.      Objective   Vital Signs:   /59   Pulse 118   Temp 98.4 °F (36.9 °C)   Ht 188 cm (74\")   Wt (!) 145 kg (320 lb)   BMI 41.09 kg/m²     Physical Exam  Vitals and nursing note reviewed.   Constitutional:       General: He is not in acute distress.     Appearance: Normal appearance. He is well-developed, well-groomed and overweight. He is not ill-appearing.      Comments: Ambulates without difficulty   Cardiovascular:      Rate and Rhythm: Normal rate and regular rhythm.   Pulmonary:      Effort: Pulmonary effort is normal.      Breath sounds: Normal breath sounds " and air entry.   Genitourinary:     Testes:         Right: Mass or tenderness not present.         Left: Mass or tenderness not present.      Epididymis:      Right: Not inflamed. No mass or tenderness.      Left: Not inflamed. No mass or tenderness.      Comments: Questionable small right hydrocele  Musculoskeletal:         General: Normal range of motion.   Skin:     General: Skin is warm and dry.   Neurological:      Mental Status: He is alert and oriented to person, place, and time.      Motor: Motor function is intact.   Psychiatric:         Mood and Affect: Mood normal.         Behavior: Behavior normal. Behavior is cooperative.         Thought Content: Thought content normal.         Judgment: Judgment normal.        Result Review :        POC Urinalysis Dipstick (02/07/2022 14:12)              Assessment and Plan    Diagnoses and all orders for this visit:    1. Sterilization consult (Primary)  -     Vasectomy; Future  -     POC Urinalysis Dipstick        Follow Up   Return in about 2 weeks (around 2/21/2022) for vasectomy per dr schulte when available..  Patient was given instructions and counseling regarding his condition or for health maintenance advice. Please see specific information pulled into the AVS if appropriate. Instructions    • The patient is to go immediately home and lie down flat on his back with an ice pack on the scrotum. He may shower in the morning but no immersion in water for 4 days. He is to avoid strenuous activity for 3 days and straddle activity for 3 weeks. He is reminded that he is not yet sterile and must use contraception until we confirm he no longer has sperm in a semen specimen to be brought to the office in 6 weeks. He is to call for problems.  • The patient will schedule a vasectomy if he desires  to proceed.  • Handouts were provided.  • Electronically Identified Patient Education Materials provided.    All risks, benefits and alternatives to vasectomy discussed and  understood. Understanding that this is considered an irreversible procedure. Written consent was obtained. Verbal and written instructions and information were provided. Schedule procedure when patient desires.    Sruthi Pelayo, APRN

## 2022-02-15 ENCOUNTER — LAB (OUTPATIENT)
Dept: LAB | Facility: HOSPITAL | Age: 32
End: 2022-02-15

## 2022-02-15 DIAGNOSIS — R14.2 BELCHING: ICD-10-CM

## 2022-02-15 DIAGNOSIS — R10.13 EPIGASTRIC PAIN: ICD-10-CM

## 2022-02-15 DIAGNOSIS — E11.9 TYPE 2 DIABETES MELLITUS WITHOUT COMPLICATION, WITHOUT LONG-TERM CURRENT USE OF INSULIN: ICD-10-CM

## 2022-02-15 DIAGNOSIS — Z11.59 NEED FOR HEPATITIS C SCREENING TEST: ICD-10-CM

## 2022-02-15 DIAGNOSIS — Z13.29 SCREENING FOR THYROID DISORDER: ICD-10-CM

## 2022-02-15 DIAGNOSIS — Z13.220 LIPID SCREENING: ICD-10-CM

## 2022-02-15 LAB
ALBUMIN SERPL-MCNC: 4.2 G/DL (ref 3.5–5.2)
ALBUMIN UR-MCNC: 1.4 MG/DL
ALBUMIN/GLOB SERPL: 1.4 G/DL
ALP SERPL-CCNC: 24 U/L (ref 39–117)
ALT SERPL W P-5'-P-CCNC: 33 U/L (ref 1–41)
AMYLASE SERPL-CCNC: 68 U/L (ref 28–100)
ANION GAP SERPL CALCULATED.3IONS-SCNC: 11 MMOL/L (ref 5–15)
AST SERPL-CCNC: 22 U/L (ref 1–40)
BASOPHILS # BLD AUTO: 0.05 10*3/MM3 (ref 0–0.2)
BASOPHILS NFR BLD AUTO: 0.7 % (ref 0–1.5)
BILIRUB SERPL-MCNC: 0.3 MG/DL (ref 0–1.2)
BUN SERPL-MCNC: 9 MG/DL (ref 6–20)
BUN/CREAT SERPL: 9 (ref 7–25)
CALCIUM SPEC-SCNC: 9.2 MG/DL (ref 8.6–10.5)
CHLORIDE SERPL-SCNC: 106 MMOL/L (ref 98–107)
CHOLEST SERPL-MCNC: 130 MG/DL (ref 0–200)
CO2 SERPL-SCNC: 23 MMOL/L (ref 22–29)
CREAT SERPL-MCNC: 1 MG/DL (ref 0.76–1.27)
CREAT UR-MCNC: 237.2 MG/DL
DEPRECATED RDW RBC AUTO: 41 FL (ref 37–54)
EOSINOPHIL # BLD AUTO: 0.16 10*3/MM3 (ref 0–0.4)
EOSINOPHIL NFR BLD AUTO: 2.2 % (ref 0.3–6.2)
ERYTHROCYTE [DISTWIDTH] IN BLOOD BY AUTOMATED COUNT: 13.9 % (ref 12.3–15.4)
GFR SERPL CREATININE-BSD FRML MDRD: 87 ML/MIN/1.73
GLOBULIN UR ELPH-MCNC: 2.9 GM/DL
GLUCOSE SERPL-MCNC: 104 MG/DL (ref 65–99)
HBA1C MFR BLD: 5.8 % (ref 4.8–5.6)
HCT VFR BLD AUTO: 42.9 % (ref 37.5–51)
HCV AB SER DONR QL: NORMAL
HDLC SERPL-MCNC: 32 MG/DL (ref 40–60)
HGB BLD-MCNC: 13.9 G/DL (ref 13–17.7)
IMM GRANULOCYTES # BLD AUTO: 0.05 10*3/MM3 (ref 0–0.05)
IMM GRANULOCYTES NFR BLD AUTO: 0.7 % (ref 0–0.5)
LDLC SERPL CALC-MCNC: 68 MG/DL (ref 0–100)
LDLC/HDLC SERPL: 1.95 {RATIO}
LIPASE SERPL-CCNC: 51 U/L (ref 13–60)
LYMPHOCYTES # BLD AUTO: 2.38 10*3/MM3 (ref 0.7–3.1)
LYMPHOCYTES NFR BLD AUTO: 32.2 % (ref 19.6–45.3)
MCH RBC QN AUTO: 26.7 PG (ref 26.6–33)
MCHC RBC AUTO-ENTMCNC: 32.4 G/DL (ref 31.5–35.7)
MCV RBC AUTO: 82.3 FL (ref 79–97)
MICROALBUMIN/CREAT UR: 5.9 MG/G
MONOCYTES # BLD AUTO: 0.56 10*3/MM3 (ref 0.1–0.9)
MONOCYTES NFR BLD AUTO: 7.6 % (ref 5–12)
NEUTROPHILS NFR BLD AUTO: 4.19 10*3/MM3 (ref 1.7–7)
NEUTROPHILS NFR BLD AUTO: 56.6 % (ref 42.7–76)
NRBC BLD AUTO-RTO: 0 /100 WBC (ref 0–0.2)
PLATELET # BLD AUTO: 270 10*3/MM3 (ref 140–450)
PMV BLD AUTO: 9.5 FL (ref 6–12)
POTASSIUM SERPL-SCNC: 4 MMOL/L (ref 3.5–5.2)
PROT SERPL-MCNC: 7.1 G/DL (ref 6–8.5)
RBC # BLD AUTO: 5.21 10*6/MM3 (ref 4.14–5.8)
SODIUM SERPL-SCNC: 140 MMOL/L (ref 136–145)
TRIGL SERPL-MCNC: 178 MG/DL (ref 0–150)
TSH SERPL DL<=0.05 MIU/L-ACNC: 0.77 UIU/ML (ref 0.27–4.2)
VLDLC SERPL-MCNC: 30 MG/DL (ref 5–40)
WBC NRBC COR # BLD: 7.39 10*3/MM3 (ref 3.4–10.8)

## 2022-02-15 PROCEDURE — 82570 ASSAY OF URINE CREATININE: CPT

## 2022-02-15 PROCEDURE — 80050 GENERAL HEALTH PANEL: CPT

## 2022-02-15 PROCEDURE — 86677 HELICOBACTER PYLORI ANTIBODY: CPT

## 2022-02-15 PROCEDURE — 86803 HEPATITIS C AB TEST: CPT

## 2022-02-15 PROCEDURE — 83690 ASSAY OF LIPASE: CPT

## 2022-02-15 PROCEDURE — 83036 HEMOGLOBIN GLYCOSYLATED A1C: CPT

## 2022-02-15 PROCEDURE — 80061 LIPID PANEL: CPT

## 2022-02-15 PROCEDURE — 36415 COLL VENOUS BLD VENIPUNCTURE: CPT

## 2022-02-15 PROCEDURE — 82150 ASSAY OF AMYLASE: CPT

## 2022-02-15 PROCEDURE — 82043 UR ALBUMIN QUANTITATIVE: CPT

## 2022-02-16 LAB
H PYLORI IGA SER-ACNC: 13 UNITS (ref 0–8.9)
H PYLORI IGG SER IA-ACNC: 0.23 INDEX VALUE (ref 0–0.79)
H PYLORI IGM SER-ACNC: <9 UNITS (ref 0–8.9)

## 2022-02-16 RX ORDER — CLARITHROMYCIN 500 MG/1
500 TABLET, COATED ORAL 2 TIMES DAILY
Qty: 28 TABLET | Refills: 0 | Status: SHIPPED | OUTPATIENT
Start: 2022-02-16 | End: 2022-03-07

## 2022-02-16 RX ORDER — METRONIDAZOLE 500 MG/1
500 TABLET ORAL 3 TIMES DAILY
Qty: 28 TABLET | Refills: 0 | Status: SHIPPED | OUTPATIENT
Start: 2022-02-16 | End: 2022-03-07

## 2022-02-17 ENCOUNTER — TELEPHONE (OUTPATIENT)
Dept: FAMILY MEDICINE CLINIC | Facility: CLINIC | Age: 32
End: 2022-02-17

## 2022-02-17 NOTE — TELEPHONE ENCOUNTER
----- Message from PERRI Reynaga sent at 2/16/2022  3:23 PM EST -----  Positive H. Pylori. Pt needs to increase the omperazole to BID use. Start Clarithromycin 500mg bid and metronidazole 500mg bid, all three medications for 14 days, ensure follow up appt with me in 6-8 weeks. I have sent in prescriptions.

## 2022-02-17 NOTE — TELEPHONE ENCOUNTER
----- Message from PERRI Reynaga sent at 2/16/2022  3:26 PM EST -----  triglycerides elevated, tighter diet control and weight loss encouraged., Hemoglobin A1c slightly elevated, tighter diet control. Will recheck in 6 months.

## 2022-02-18 ENCOUNTER — PATIENT ROUNDING (BHMG ONLY) (OUTPATIENT)
Dept: UROLOGY | Facility: CLINIC | Age: 32
End: 2022-02-18

## 2022-02-18 NOTE — PROGRESS NOTES
February 18, 2022    Hello, may I speak with Abdiel Terrell?    My name is Addis    I am  with [facility]  [address]  [address]  [place] [address] [phone].    Before we get started may I verify your date of birth? 1990    I am calling to officially welcome you to our practice and ask about your recent visit. Is this a good time to talk? yes    Tell me about your visit with us. What things went well?  it went fine. Staff was friendly       We're always looking for ways to make our patients' experiences even better. Do you have recommendations on ways we may improve? Not at this time   Overall were you satisfied with your first visit to our practice? yes       I appreciate you taking the time to speak with me today. Is there anything else I can do for you?no    Thank you, and have a great day.

## 2022-02-22 DIAGNOSIS — K21.9 GASTROESOPHAGEAL REFLUX DISEASE WITHOUT ESOPHAGITIS: Primary | ICD-10-CM

## 2022-02-22 RX ORDER — OMEPRAZOLE 20 MG/1
20 CAPSULE, DELAYED RELEASE ORAL 2 TIMES DAILY
Qty: 60 CAPSULE | Refills: 0 | Status: SHIPPED | OUTPATIENT
Start: 2022-02-22 | End: 2022-04-04 | Stop reason: DRUGHIGH

## 2022-03-07 ENCOUNTER — PREP FOR SURGERY (OUTPATIENT)
Dept: OTHER | Facility: HOSPITAL | Age: 32
End: 2022-03-07

## 2022-03-07 ENCOUNTER — OFFICE VISIT (OUTPATIENT)
Dept: GASTROENTEROLOGY | Facility: CLINIC | Age: 32
End: 2022-03-07

## 2022-03-07 VITALS
SYSTOLIC BLOOD PRESSURE: 122 MMHG | HEART RATE: 85 BPM | HEIGHT: 74 IN | WEIGHT: 315 LBS | DIASTOLIC BLOOD PRESSURE: 77 MMHG | BODY MASS INDEX: 40.43 KG/M2

## 2022-03-07 DIAGNOSIS — R10.13 EPIGASTRIC PAIN: Primary | ICD-10-CM

## 2022-03-07 DIAGNOSIS — K21.9 GASTROESOPHAGEAL REFLUX DISEASE WITHOUT ESOPHAGITIS: Primary | ICD-10-CM

## 2022-03-07 DIAGNOSIS — R76.8 HELICOBACTER PYLORI ANTIBODY POSITIVE: ICD-10-CM

## 2022-03-07 DIAGNOSIS — R12 HEARTBURN: ICD-10-CM

## 2022-03-07 DIAGNOSIS — R10.13 EPIGASTRIC PAIN: ICD-10-CM

## 2022-03-07 PROBLEM — E11.9 DIABETES MELLITUS: Status: ACTIVE | Noted: 2022-03-07

## 2022-03-07 PROBLEM — E11.9 TYPE 2 DIABETES MELLITUS WITHOUT COMPLICATION: Status: ACTIVE | Noted: 2022-03-07

## 2022-03-07 PROBLEM — J30.9 ALLERGIC RHINITIS: Status: ACTIVE | Noted: 2022-03-07

## 2022-03-07 PROBLEM — D50.9 ANEMIA, IRON DEFICIENCY: Status: ACTIVE | Noted: 2019-02-12

## 2022-03-07 PROBLEM — F41.9 ANXIETY DISORDER: Status: ACTIVE | Noted: 2022-03-07

## 2022-03-07 PROCEDURE — 99213 OFFICE O/P EST LOW 20 MIN: CPT | Performed by: NURSE PRACTITIONER

## 2022-03-07 NOTE — PROGRESS NOTES
Patient Name: Abdiel Terrell   Visit Date: 2022   Patient ID: [id number]  Provider: PERRI Moss    Sex: male  Location:  Location Address:  Location Phone: [address]  [place] [address] [phone]    YOB: 1990  Age: 31 y.o.   Primary Care Provider Atiya Hodge APRN      Referring Provider: No ref. provider found        Chief Complaint  H.pylori and Heartburn    History of Present Illness  New pt presents w hx of abd pain and HB that started in August, was worse w eating and had early satiety. States CT abd/pelvis w/o contrast negative, reports HIDA scan negative. Pt switched PCP's and had more testing - H. pylori serology with positive IgA, IgG and IgM were negative, 2/15/2022, was treated with clarithromycin 500 twice daily, Flagyl 500 twice daily and omeprazole twice daily. Pt states he feels better, states abd pain resolved, just finished meds a couple days ago.   No diarrhea or constipation, no blood in stool. (had some loose stools w antibiotics)  Has had COVID vaccine (also had Covid in Feb).    Pt has had EGD  /COLON  and negative w Dr Kirk  Past Medical History:   Diagnosis Date   • Diabetes mellitus (HCC)    • GERD (gastroesophageal reflux disease)    • Hypertension        Past Surgical History:   Procedure Laterality Date   • APPENDECTOMY     • FEMUR SURGERY     • NASAL SEPTUM SURGERY     • TONSILLECTOMY     • UPPER GASTROINTESTINAL ENDOSCOPY         Allergies   Allergen Reactions   • Morphine Hives   • Penicillins Hives       Family History   Problem Relation Age of Onset   • Cancer Mother    • Cancer Father    • Colon cancer Neg Hx         Social History     Tobacco Use   • Smoking status: Former Smoker     Types: Cigarettes     Start date:      Quit date: 2017     Years since quittin.0   • Smokeless tobacco: Current User     Types: Chew   Vaping Use   • Vaping Use: Never used   Substance Use Topics   • Alcohol use: Yes     Comment:  "occ   • Drug use: Never       Objective     Vital Signs:   /77 (BP Location: Left arm, Patient Position: Sitting, Cuff Size: Large Adult)   Pulse 85   Ht 188 cm (74\")   Wt (!) 146 kg (322 lb 14.4 oz)   BMI 41.46 kg/m²       Physical Exam  Constitutional:       General: The patient is not in acute distress.     Appearance: Normal appearance.   HENT:      Head: Normocephalic and atraumatic.      Nose: Nose normal.   Pulmonary:      Effort: Pulmonary effort is normal. No respiratory distress.   Abdominal:      General: Abdomen is flat.      Palpations: Abdomen is soft. There is no mass.      Tenderness: There is no abdominal tenderness. There is no guarding.   Musculoskeletal:      Cervical back: Neck supple.      Right lower leg: No edema.      Left lower leg: No edema.   Skin:     General: Skin is warm and dry.   Neurological:      General: No focal deficit present.      Mental Status: The patient is alert and oriented to person, place, and time.      Gait: Gait normal.   Psychiatric:         Mood and Affect: Mood normal.         Speech: Speech normal.         Behavior: Behavior normal.         Thought Content: Thought content normal.     Result Review :   The following data was reviewed by: PERRI Moss on 03/07/2022:    CBC w/diff    CBC w/Diff 2/15/22   WBC 7.39   RBC 5.21   Hemoglobin 13.9   Hematocrit 42.9   MCV 82.3   MCH 26.7   MCHC 32.4   RDW 13.9   Platelets 270   Neutrophil Rel % 56.6   Immature Granulocyte Rel % 0.7 (A)   Lymphocyte Rel % 32.2   Monocyte Rel % 7.6   Eosinophil Rel % 2.2   Basophil Rel % 0.7   (A) Abnormal value            CMP    CMP 2/15/22   Glucose 104 (A)   BUN 9   Creatinine 1.00   eGFR Non African Am 87   Sodium 140   Potassium 4.0   Chloride 106   Calcium 9.2   Albumin 4.20   Total Bilirubin 0.3   Alkaline Phosphatase 24 (A)   AST (SGOT) 22   ALT (SGPT) 33   (A) Abnormal value                          Assessment and Plan    Diagnoses and all orders for this " visit:    1. Epigastric pain (Primary)  Comments:  improved    2. Helicobacter pylori antibody positive  Comments:  s/p Rx by PCP of Flagyl/Clarithromycin/PPI    3. Heartburn            Follow Up      EGD to f/u on Hpylori + Ab and epigastric pain Surgical Risk and Benefits: Possible risks/complications, benefits, and alternatives to surgical or invasive procedure have been explained to patient and/or legal guardian; risks include bleeding, infection, and perforation. Patient has been evaluated and can tolerate anesthesia and/or sedation. Risks, benefits, and alternatives to anesthesia and sedation have been explained to patient and/or legal guardian.   Pt agreeable to plan    Patient was given instructions and counseling regarding his condition or for health maintenance advice. Please see specific information pulled into the AVS if appropriate.

## 2022-03-11 ENCOUNTER — OFFICE VISIT (OUTPATIENT)
Dept: UROLOGY | Facility: CLINIC | Age: 32
End: 2022-03-11

## 2022-03-11 DIAGNOSIS — Z30.2 STERILIZATION: Primary | ICD-10-CM

## 2022-03-11 PROCEDURE — 55250 REMOVAL OF SPERM DUCT(S): CPT | Performed by: UROLOGY

## 2022-03-16 ENCOUNTER — PATIENT ROUNDING (BHMG ONLY) (OUTPATIENT)
Dept: GASTROENTEROLOGY | Facility: CLINIC | Age: 32
End: 2022-03-16

## 2022-03-16 NOTE — PROGRESS NOTES
March 16, 2022    Hello, may I speak with Abdiel Terrell?    My name is Trena     I am  with [facility]  [address]  [place] [address] [phone].    Before we get started may I verify your date of birth? 1990    I am calling to officially welcome you to our practice and ask about your recent visit. Is this a good time to talk? Yes    Tell me about your visit with us. What things went well?  Everything went well      We're always looking for ways to make our patients' experiences even better. Do you have recommendations on ways we may improve? No     Overall were you satisfied with your first visit to our practice? Yes        I appreciate you taking the time to speak with me today. Is there anything else I can do for you?  No      Thank you, and have a great day.

## 2022-03-17 ENCOUNTER — OFFICE VISIT (OUTPATIENT)
Dept: UROLOGY | Facility: CLINIC | Age: 32
End: 2022-03-17

## 2022-03-17 VITALS
SYSTOLIC BLOOD PRESSURE: 134 MMHG | HEART RATE: 72 BPM | TEMPERATURE: 98 F | BODY MASS INDEX: 40.43 KG/M2 | HEIGHT: 74 IN | WEIGHT: 315 LBS | DIASTOLIC BLOOD PRESSURE: 74 MMHG

## 2022-03-17 DIAGNOSIS — N50.812 TESTICULAR PAIN, LEFT: Primary | ICD-10-CM

## 2022-03-17 DIAGNOSIS — G89.18 POSTOPERATIVE PAIN: ICD-10-CM

## 2022-03-17 LAB
BILIRUB BLD-MCNC: NEGATIVE MG/DL
CLARITY, POC: CLEAR
COLOR UR: YELLOW
EXPIRATION DATE: NORMAL
GLUCOSE UR STRIP-MCNC: NEGATIVE MG/DL
KETONES UR QL: NEGATIVE
LEUKOCYTE EST, POC: NEGATIVE
Lab: NORMAL
NITRITE UR-MCNC: NEGATIVE MG/ML
PH UR: 5.5 [PH] (ref 5–8)
PROT UR STRIP-MCNC: NEGATIVE MG/DL
RBC # UR STRIP: NEGATIVE /UL
SP GR UR: 1.03 (ref 1–1.03)
UROBILINOGEN UR QL: NORMAL

## 2022-03-17 PROCEDURE — 81003 URINALYSIS AUTO W/O SCOPE: CPT | Performed by: UROLOGY

## 2022-03-17 PROCEDURE — 99024 POSTOP FOLLOW-UP VISIT: CPT | Performed by: UROLOGY

## 2022-03-17 NOTE — PROGRESS NOTES
"Chief Complaint  Post-op Problem.  Postvasectomy pain left testicle    Subjective          Abdiel Rico Terrell presents to [facility]  History of Present Illness    31-year-old white male had vasectomy 6 days ago and has been having pain in the left testicle starting that night.  Is obvious bruising but not much swelling.  No fever or chills    Objective No acute distress  Vital Signs:   /74   Pulse 72   Temp 98 °F (36.7 °C)   Ht 188 cm (74\")   Wt (!) 146 kg (322 lb)   BMI 41.34 kg/m²     Allergies   Allergen Reactions   • Morphine Hives   • Penicillins Hives      Past medical history:  has a past medical history of Diabetes mellitus (HCC), GERD (gastroesophageal reflux disease), and Hypertension.   Past surgical history:  has a past surgical history that includes Femur Surgery; Tonsillectomy; Appendectomy; Nasal septum surgery; Upper gastrointestinal endoscopy; and Vasectomy.  Personal history: family history includes Cancer in his father and mother.  Social history:  reports that he quit smoking about 5 years ago. His smoking use included cigarettes. He started smoking about 18 years ago. His smokeless tobacco use includes chew. He reports current alcohol use. He reports that he does not use drugs.    Review of Systems    No change from before    Physical Exam  Constitutional:       General: He is not in acute distress.     Appearance: He is obese. He is not toxic-appearing.   HENT:      Head: Normocephalic and atraumatic.   Abdominal:      Palpations: Abdomen is soft. There is no mass.      Tenderness: There is no abdominal tenderness.   Genitourinary:     Comments: Incision is nicely healed    Bruising over the scrotum more on the left than the right    Right testicle is normal    Left testicle is tender.  I can feel tenderness in the spermatic cord on the left side.  No evidence of infection  Neurological:      General: No focal deficit present.      Mental Status: He is alert " and oriented to person, place, and time.      Gait: Gait normal.   Psychiatric:         Mood and Affect: Mood normal.         Behavior: Behavior normal.         Thought Content: Thought content normal.         Judgment: Judgment normal.        Result Review :                 Assessment and Plan    Diagnoses and all orders for this visit:    1. Testicular pain, left (Primary)  -     POC Urinalysis Dipstick, Automated    2. Postoperative pain  -     POC Urinalysis Dipstick, Automated    I think it is healing process and I do not think there is any infection on that side.  I have reassured him and I will recheck him in 2 weeks    Follow Up   No follow-ups on file.  Patient was given instructions and counseling regarding his condition or for health maintenance advice. Please see specific information pulled into the AVS if appropriate.     Lacey Mendez MD

## 2022-04-04 ENCOUNTER — OFFICE VISIT (OUTPATIENT)
Dept: FAMILY MEDICINE CLINIC | Facility: CLINIC | Age: 32
End: 2022-04-04

## 2022-04-04 ENCOUNTER — OFFICE VISIT (OUTPATIENT)
Dept: UROLOGY | Facility: CLINIC | Age: 32
End: 2022-04-04

## 2022-04-04 VITALS
WEIGHT: 315 LBS | HEART RATE: 87 BPM | BODY MASS INDEX: 40.43 KG/M2 | HEIGHT: 74 IN | DIASTOLIC BLOOD PRESSURE: 62 MMHG | SYSTOLIC BLOOD PRESSURE: 135 MMHG | TEMPERATURE: 98.3 F

## 2022-04-04 VITALS
OXYGEN SATURATION: 99 % | DIASTOLIC BLOOD PRESSURE: 54 MMHG | SYSTOLIC BLOOD PRESSURE: 111 MMHG | WEIGHT: 315 LBS | HEIGHT: 74 IN | HEART RATE: 88 BPM | TEMPERATURE: 98.1 F | BODY MASS INDEX: 40.43 KG/M2

## 2022-04-04 DIAGNOSIS — K21.9 GASTROESOPHAGEAL REFLUX DISEASE WITHOUT ESOPHAGITIS: ICD-10-CM

## 2022-04-04 DIAGNOSIS — E11.9 TYPE 2 DIABETES MELLITUS WITHOUT COMPLICATION, WITHOUT LONG-TERM CURRENT USE OF INSULIN: ICD-10-CM

## 2022-04-04 DIAGNOSIS — R76.8 HELICOBACTER PYLORI ANTIBODY POSITIVE: Primary | ICD-10-CM

## 2022-04-04 DIAGNOSIS — Z48.89 POSTOPERATIVE VISIT: Primary | ICD-10-CM

## 2022-04-04 PROCEDURE — 99214 OFFICE O/P EST MOD 30 MIN: CPT | Performed by: NURSE PRACTITIONER

## 2022-04-04 PROCEDURE — 99024 POSTOP FOLLOW-UP VISIT: CPT | Performed by: UROLOGY

## 2022-04-04 RX ORDER — LISINOPRIL 10 MG/1
10 TABLET ORAL DAILY
COMMUNITY
Start: 2022-03-30 | End: 2022-04-04 | Stop reason: SDUPTHER

## 2022-04-04 RX ORDER — LISINOPRIL 10 MG/1
10 TABLET ORAL DAILY
Qty: 30 TABLET | Refills: 5 | Status: SHIPPED | OUTPATIENT
Start: 2022-04-04 | End: 2022-08-30 | Stop reason: SDUPTHER

## 2022-04-04 RX ORDER — SEMAGLUTIDE 1.34 MG/ML
0.5 INJECTION, SOLUTION SUBCUTANEOUS WEEKLY
Qty: 1 PEN | Refills: 5 | Status: SHIPPED | OUTPATIENT
Start: 2022-04-04 | End: 2022-10-31 | Stop reason: SDUPTHER

## 2022-04-04 RX ORDER — ALUMINUM CHLORIDE 20 %
1 SOLUTION, NON-ORAL TOPICAL NIGHTLY
COMMUNITY
End: 2022-12-25 | Stop reason: SDUPTHER

## 2022-04-04 RX ORDER — OMEPRAZOLE 20 MG/1
20 CAPSULE, DELAYED RELEASE ORAL DAILY
COMMUNITY
End: 2022-04-04 | Stop reason: SDUPTHER

## 2022-04-04 RX ORDER — METFORMIN HYDROCHLORIDE 500 MG/1
500 TABLET, EXTENDED RELEASE ORAL 2 TIMES DAILY
Qty: 60 TABLET | Refills: 5 | Status: SHIPPED | OUTPATIENT
Start: 2022-04-04 | End: 2022-08-30 | Stop reason: SDUPTHER

## 2022-04-04 RX ORDER — OMEPRAZOLE 20 MG/1
20 CAPSULE, DELAYED RELEASE ORAL DAILY
Qty: 30 CAPSULE | Refills: 5 | Status: SHIPPED | OUTPATIENT
Start: 2022-04-04 | End: 2022-08-30 | Stop reason: SDUPTHER

## 2022-04-04 NOTE — PROGRESS NOTES
"Chief Complaint  Follow-up (Post vasectomy visit)    Patient had testicular pain after vasectomy    Subjective          Abdiel Terrell presents to [facility]  History of Present Illness    Patient is doing fine and has no pain    Objective   Vital Signs:   /62   Pulse 87   Temp 98.3 °F (36.8 °C) (Infrared)   Ht 188 cm (74\")   Wt (!) 146 kg (322 lb)   BMI 41.34 kg/m²     Allergies   Allergen Reactions   • Morphine Hives   • Penicillins Hives      Past medical history:  has a past medical history of Diabetes mellitus (HCC), GERD (gastroesophageal reflux disease), and Hypertension.   Past surgical history:  has a past surgical history that includes Femur Surgery; Tonsillectomy; Appendectomy; Nasal septum surgery; Upper gastrointestinal endoscopy; and Vasectomy.  Personal history: family history includes Cancer in his father and mother.  Social history:  reports that he quit smoking about 5 years ago. His smoking use included cigarettes. He started smoking about 18 years ago. His smokeless tobacco use includes chew. He reports current alcohol use. He reports that he does not use drugs.    Review of Systems    No change from before    Physical Exam  Constitutional:       General: He is not in acute distress.     Appearance: Normal appearance. He is obese. He is not ill-appearing or toxic-appearing.   HENT:      Head: Normocephalic and atraumatic.   Genitourinary:     Penis: Normal.       Testes: Normal.   Skin:     General: Skin is warm.      Coloration: Skin is not jaundiced.   Neurological:      General: No focal deficit present.      Mental Status: He is alert and oriented to person, place, and time.      Motor: No weakness.   Psychiatric:         Mood and Affect: Mood normal.         Behavior: Behavior normal.         Thought Content: Thought content normal.         Judgment: Judgment normal.        Result Review :                 Assessment and Plan {CC Problem List  Visit " Diagnosis   ROS  Review (Popup)  Health Maintenance  Quality  BestPractice  Medications  SmartSets  SnapShot Encounters  Media :23}   Diagnoses and all orders for this visit:    1. Postoperative visit (Primary)    Postvasectomy pain is resolved and patient is doing fine.  We will see him on as needed basis    Follow Up   No follow-ups on file.  Patient was given instructions and counseling regarding his condition or for health maintenance advice. Please see specific information pulled into the AVS if appropriate.     Lacey Mendez MD

## 2022-04-04 NOTE — PROGRESS NOTES
Chief Complaint  Follow-up (H. Pylori), Diabetes, and Heartburn    Subjective          Abdiel Rico Terrell presents to [facility]  History of Present Illness      Patient states he is feeling much better since completing antibiotics for H. Pylori.  Patient denies nausea, vomiting, diarrhea, abdominal pain, bloating.    Patient is requesting medication refills today.    Diabetes Mellitus, type 2: Patient is taking Metformin, Ozempic, he is taking Lisinopril to protect his kidneys from taking Metformin. Patient is compliant with medications.  Patient's last A1c is 5.8% on 2/15/22. Patient monitors blood sugar at home with average readings of 100s-110s.  Patient denies extreme high and low blood sugars. Patient's last DM eye exam was 2021 per [facility].  Patient states he does not get foot exams.  Patient denies any unhealing sores. Patient attempts to monitor carbohydrate/ sugar intake in diet.     Gastroesophageal Reflux:  Patient is taking Omeprazole, with good control of symptoms.  Patient does not need over the counter medications for breakthrough symptoms.  Patient tries to avoid trigger foods, eat frequent small meals, not lie down within 2 hours of eating, avoids NSAIDS medications and alcohol.      Past Medical History:   Diagnosis Date   • Diabetes mellitus (HCC)    • GERD (gastroesophageal reflux disease)    • Hypertension          Allergies   Allergen Reactions   • Morphine Hives   • Penicillins Hives          Past Surgical History:   Procedure Laterality Date   • APPENDECTOMY     • FEMUR SURGERY     • NASAL SEPTUM SURGERY     • TONSILLECTOMY     • UPPER GASTROINTESTINAL ENDOSCOPY     • VASECTOMY      3/11/22          Social History     Tobacco Use   • Smoking status: Former Smoker     Types: Cigarettes     Start date:      Quit date: 2017     Years since quittin.1   • Smokeless tobacco: Current User     Types: Chew   Substance Use Topics   • Alcohol use: Yes     " Comment: occ         Family History   Problem Relation Age of Onset   • Cancer Mother    • Cancer Father    • Colon cancer Neg Hx           Current Outpatient Medications on File Prior to Visit   Medication Sig   • aluminum chloride (Drysol) 20 % external solution Apply 1 each topically to the appropriate area as directed Every Night.   • [DISCONTINUED] lisinopril (PRINIVIL,ZESTRIL) 10 MG tablet Take 10 mg by mouth Daily.   • [DISCONTINUED] metFORMIN ER (GLUCOPHAGE-XR) 500 MG 24 hr tablet Take 500 mg by mouth 2 (Two) Times a Day.   • [DISCONTINUED] omeprazole (priLOSEC) 20 MG capsule Take 20 mg by mouth Daily.   • [DISCONTINUED] Semaglutide,0.25 or 0.5MG/DOS, (Ozempic, 0.25 or 0.5 MG/DOSE,) 2 MG/1.5ML solution pen-injector Ozempic 0.25 mg or 0.5 mg (2 mg/1.5 mL) subcutaneous pen injector   • [DISCONTINUED] acetaminophen (TYLENOL) 500 MG tablet Take 1 tablet by mouth Every 6 (Six) Hours As Needed for Mild Pain .   • [DISCONTINUED] omeprazole (priLOSEC) 20 MG capsule Take 1 capsule by mouth 2 (Two) Times a Day.     No current facility-administered medications on file prior to visit.         Immunization History   Administered Date(s) Administered   • COVID-19 (PFIZER) PURPLE CAP 03/26/2021, 04/14/2021   • Flu Vaccine Quad PF >36MO 08/29/2017, 09/09/2018, 10/12/2019   • Flublok 18+yrs 10/12/2020   • Hepatitis A 05/03/2018, 11/07/2018   • Influenza Quad Vaccine (Inpatient) 10/02/2016   • MMR 05/01/2000   • Pneumococcal Polysaccharide (PPSV23) 09/06/2017   • Td 09/17/2003   • Tdap 10/04/2016   • flucelvax quad pfs =>4 YRS 11/23/2021         /54 (BP Location: Left arm, Patient Position: Sitting, Cuff Size: Adult)   Pulse 88   Temp 98.1 °F (36.7 °C) (Oral)   Ht 188 cm (74\")   Wt (!) 143 kg (315 lb)   SpO2 99%   BMI 40.44 kg/m²             Physical Exam  Vitals reviewed.   Constitutional:       Appearance: Normal appearance. He is well-developed.   HENT:      Head: Normocephalic and atraumatic.      Right Ear: " External ear normal.      Left Ear: External ear normal.      Mouth/Throat:      Pharynx: No oropharyngeal exudate.   Eyes:      Conjunctiva/sclera: Conjunctivae normal.      Pupils: Pupils are equal, round, and reactive to light.   Cardiovascular:      Rate and Rhythm: Normal rate and regular rhythm.      Heart sounds: No murmur heard.    No friction rub. No gallop.   Pulmonary:      Effort: Pulmonary effort is normal.      Breath sounds: Normal breath sounds. No wheezing or rhonchi.   Abdominal:      General: Bowel sounds are normal.      Palpations: Abdomen is soft.   Skin:     General: Skin is warm and dry.   Neurological:      Mental Status: He is alert and oriented to person, place, and time.      Cranial Nerves: No cranial nerve deficit.   Psychiatric:         Mood and Affect: Mood and affect normal.         Behavior: Behavior normal.         Thought Content: Thought content normal.         Judgment: Judgment normal.             Result Review :                           Assessment and Plan      Diagnoses and all orders for this visit:    1. Helicobacter pylori antibody positive (Primary)  Comments:  Symptoms resolved after completion of treatment, continue follow-up with GI provider.  Patient already scheduled for EGD    2. Type 2 diabetes mellitus without complication, without long-term current use of insulin ([facility])  Comments:  Symptoms well controlled with current medication regimen, cont. Current meds.  Orders:  -     lisinopril (PRINIVIL,ZESTRIL) 10 MG tablet; Take 1 tablet by mouth Daily.  Dispense: 30 tablet; Refill: 5  -     metFORMIN ER (GLUCOPHAGE-XR) 500 MG 24 hr tablet; Take 1 tablet by mouth 2 (Two) Times a Day.  Dispense: 60 tablet; Refill: 5  -     Semaglutide,0.25 or 0.5MG/DOS, (Ozempic, 0.25 or 0.5 MG/DOSE,) 2 MG/1.5ML solution pen-injector; Inject 0.5 mg under the skin into the appropriate area as directed 1 (One) Time Per Week.  Dispense: 1 pen; Refill: 5    3. Gastroesophageal reflux disease  without esophagitis  Comments:  Symptoms well controlled with current medication regimen, cont. Current meds.  Orders:  -     omeprazole (priLOSEC) 20 MG capsule; Take 1 capsule by mouth Daily.  Dispense: 30 capsule; Refill: 5      Patient given written order for lab work to be completed 1 week prior to next office visit.        Follow Up     Return in about 4 months (around 8/4/2022).    Patient was given instructions and counseling regarding his condition or for health maintenance advice. Please see specific information pulled into the AVS if appropriate.

## 2022-04-15 NOTE — PRE-PROCEDURE INSTRUCTIONS
Pt instructed where to come to and nothing to eat or drink after midnight no meds noted to take in the am instructed to hold metformin Luis Fernando night and Monday am  arrival time is 1000 am  on  Monday 4/18/22 pt has been vaccinated x2 blood sugar ordered

## 2022-04-18 ENCOUNTER — HOSPITAL ENCOUNTER (OUTPATIENT)
Facility: HOSPITAL | Age: 32
Setting detail: HOSPITAL OUTPATIENT SURGERY
Discharge: HOME | End: 2022-04-18
Attending: INTERNAL MEDICINE | Admitting: INTERNAL MEDICINE

## 2022-04-18 ENCOUNTER — ANESTHESIA EVENT (OUTPATIENT)
Dept: GASTROENTEROLOGY | Facility: HOSPITAL | Age: 32
End: 2022-04-18

## 2022-04-18 ENCOUNTER — ANESTHESIA (OUTPATIENT)
Dept: GASTROENTEROLOGY | Facility: HOSPITAL | Age: 32
End: 2022-04-18

## 2022-04-18 VITALS
SYSTOLIC BLOOD PRESSURE: 105 MMHG | HEIGHT: 74 IN | BODY MASS INDEX: 40.43 KG/M2 | OXYGEN SATURATION: 99 % | DIASTOLIC BLOOD PRESSURE: 92 MMHG | TEMPERATURE: 98.5 F | HEART RATE: 91 BPM | RESPIRATION RATE: 17 BRPM | WEIGHT: 315 LBS

## 2022-04-18 DIAGNOSIS — R10.13 EPIGASTRIC PAIN: ICD-10-CM

## 2022-04-18 DIAGNOSIS — R76.8 HELICOBACTER PYLORI ANTIBODY POSITIVE: ICD-10-CM

## 2022-04-18 LAB — GLUCOSE BLDC GLUCOMTR-MCNC: 85 MG/DL (ref 70–99)

## 2022-04-18 PROCEDURE — 88305 TISSUE EXAM BY PATHOLOGIST: CPT | Performed by: INTERNAL MEDICINE

## 2022-04-18 PROCEDURE — 25010000002 PROPOFOL 10 MG/ML EMULSION: Performed by: NURSE ANESTHETIST, CERTIFIED REGISTERED

## 2022-04-18 PROCEDURE — 45380 COLONOSCOPY AND BIOPSY: CPT | Performed by: INTERNAL MEDICINE

## 2022-04-18 PROCEDURE — 82962 GLUCOSE BLOOD TEST: CPT

## 2022-04-18 RX ORDER — LIDOCAINE HYDROCHLORIDE 20 MG/ML
INJECTION, SOLUTION EPIDURAL; INFILTRATION; INTRACAUDAL; PERINEURAL AS NEEDED
Status: DISCONTINUED | OUTPATIENT
Start: 2022-04-18 | End: 2022-04-18 | Stop reason: SURG

## 2022-04-18 RX ORDER — SODIUM CHLORIDE, SODIUM LACTATE, POTASSIUM CHLORIDE, CALCIUM CHLORIDE 600; 310; 30; 20 MG/100ML; MG/100ML; MG/100ML; MG/100ML
30 INJECTION, SOLUTION INTRAVENOUS CONTINUOUS
Status: DISCONTINUED | OUTPATIENT
Start: 2022-04-18 | End: 2022-04-18 | Stop reason: HOSPADM

## 2022-04-18 RX ORDER — PROPOFOL 10 MG/ML
VIAL (ML) INTRAVENOUS AS NEEDED
Status: DISCONTINUED | OUTPATIENT
Start: 2022-04-18 | End: 2022-04-18 | Stop reason: SURG

## 2022-04-18 RX ADMIN — LIDOCAINE HYDROCHLORIDE 100 MG: 20 INJECTION, SOLUTION EPIDURAL; INFILTRATION; INTRACAUDAL; PERINEURAL at 13:00

## 2022-04-18 RX ADMIN — SODIUM CHLORIDE, POTASSIUM CHLORIDE, SODIUM LACTATE AND CALCIUM CHLORIDE 30 ML/HR: 600; 310; 30; 20 INJECTION, SOLUTION INTRAVENOUS at 12:55

## 2022-04-18 RX ADMIN — PROPOFOL 100 MG: 10 INJECTION, EMULSION INTRAVENOUS at 13:00

## 2022-04-18 RX ADMIN — PROPOFOL 100 MG: 10 INJECTION, EMULSION INTRAVENOUS at 13:10

## 2022-04-18 NOTE — ANESTHESIA POSTPROCEDURE EVALUATION
Patient: Abdiel Terrell    Procedure Summary     Date: 04/18/22 Room / Location: [facility] ENDOSCOPY 4 / [facility] ENDOSCOPY    Anesthesia Start: 1257 Anesthesia Stop: 1317    Procedure: ESOPHAGOGASTRODUODENOSCOPY WITH BIOPSIES (N/A ) Diagnosis:       Epigastric pain      Helicobacter pylori antibody positive      (Epigastric pain [R10.13])      (Helicobacter pylori antibody positive [R76.8])    Surgeons: Nadeem Kirk MD Provider: Fredy Holt MD    Anesthesia Type: general ASA Status: 3          Anesthesia Type: general    Vitals  Vitals Value Taken Time   /92 04/18/22 1326   Temp 36.9 °C (98.5 °F) 04/18/22 1326   Pulse 91 04/18/22 1326   Resp 17 04/18/22 1326   SpO2 99 % 04/18/22 1326           Post Anesthesia Care and Evaluation    Patient location during evaluation: bedside  Patient participation: complete - patient participated  Level of consciousness: awake  Pain score: 0  Pain management: adequate  Airway patency: patent  Anesthetic complications: No anesthetic complications  PONV Status: none  Cardiovascular status: acceptable and stable  Respiratory status: acceptable and room air  Hydration status: acceptable    Comments: An Anesthesiologist personally participated in the most demanding procedures (including induction and emergence if applicable) in the anesthesia plan, monitored the course of anesthesia administration at frequent intervals and remained physically present and available for immediate diagnosis and treatment of emergencies.

## 2022-04-18 NOTE — H&P
Pre Procedure History & Physical    Chief Complaint:   Epigastric pain    Subjective     HPI:   Epigastric pain    Past Medical History:   Past Medical History:   Diagnosis Date   • Diabetes mellitus (HCC)    • GERD (gastroesophageal reflux disease)    • Hypertension        Past Surgical History:  Past Surgical History:   Procedure Laterality Date   • APPENDECTOMY     • FEMUR SURGERY     • NASAL SEPTUM SURGERY     • TONSILLECTOMY     • UPPER GASTROINTESTINAL ENDOSCOPY     • VASECTOMY      3/11/22       Family History:  Family History   Problem Relation Age of Onset   • Cancer Mother    • Cancer Father    • Colon cancer Neg Hx    • Malig Hyperthermia Neg Hx        Social History:   reports that he quit smoking about 5 years ago. His smoking use included cigarettes. He started smoking about 18 years ago. He has a 7.50 pack-year smoking history. His smokeless tobacco use includes chew. He reports current alcohol use. He reports that he does not use drugs.    Medications:   Medications Prior to Admission   Medication Sig Dispense Refill Last Dose   • aluminum chloride (Drysol) 20 % external solution Apply 1 each topically to the appropriate area as directed Every Night.   4/17/2022 at Unknown time   • lisinopril (PRINIVIL,ZESTRIL) 10 MG tablet Take 1 tablet by mouth Daily. 30 tablet 5 4/17/2022 at Unknown time   • metFORMIN ER (GLUCOPHAGE-XR) 500 MG 24 hr tablet Take 1 tablet by mouth 2 (Two) Times a Day. (Patient taking differently: Take 500 mg by mouth 2 (Two) Times a Day. Hold Sunday night and Monday am) 60 tablet 5 4/17/2022 at Unknown time   • omeprazole (priLOSEC) 20 MG capsule Take 1 capsule by mouth Daily. 30 capsule 5 4/17/2022 at Unknown time   • Semaglutide,0.25 or 0.5MG/DOS, (Ozempic, 0.25 or 0.5 MG/DOSE,) 2 MG/1.5ML solution pen-injector Inject 0.5 mg under the skin into the appropriate area as directed 1 (One) Time Per Week. (Patient taking differently: Inject 0.5 mg under the skin into the appropriate  "area as directed 1 (One) Time Per Week. Sunday   hold and take Monday night) 1 pen 5 Past Week at Unknown time       Allergies:  Morphine and Penicillins        Objective     Blood pressure 98/59, pulse 84, temperature 98.1 °F (36.7 °C), temperature source Temporal, resp. rate 16, height 188 cm (74.02\"), weight (!) 144 kg (316 lb 9.3 oz), SpO2 98 %.    Physical Exam   Constitutional: Pt is oriented to person, place, and time and well-developed, well-nourished, and in no distress.   Mouth/Throat: Oropharynx is clear and moist.   Neck: Normal range of motion.   Cardiovascular: Normal rate, regular rhythm and normal heart sounds.    Pulmonary/Chest: Effort normal and breath sounds normal.   Abdominal: Soft. Nontender  Skin: Skin is warm and dry.   Psychiatric: Mood, memory, affect and judgment normal.     Assessment/Plan     Diagnosis:  Epigastric pain    Anticipated Surgical Procedure:  EGD    The risks, benefits, and alternatives of this procedure have been discussed with the patient or the responsible party- the patient understands and agrees to proceed.            "

## 2022-04-18 NOTE — ANESTHESIA PREPROCEDURE EVALUATION
Anesthesia Evaluation     Patient summary reviewed and Nursing notes reviewed   no history of anesthetic complications:  NPO Solid Status: > 8 hours  NPO Liquid Status: > 2 hours           Airway   Mallampati: II  TM distance: >3 FB  Neck ROM: full  No difficulty expected  Dental      Pulmonary - negative pulmonary ROS and normal exam    breath sounds clear to auscultation  Cardiovascular - normal exam  Exercise tolerance: good (4-7 METS)    Rhythm: regular  Rate: normal    (+) hypertension,       Neuro/Psych  (+) psychiatric history Anxiety,    GI/Hepatic/Renal/Endo    (+)  GERD,  diabetes mellitus,     Musculoskeletal (-) negative ROS    Abdominal    Substance History - negative use     OB/GYN negative ob/gyn ROS         Other - negative ROS                       Anesthesia Plan    ASA 3     general       Anesthetic plan, all risks, benefits, and alternatives have been provided, discussed and informed consent has been obtained with: patient.        CODE STATUS:

## 2022-04-20 LAB
CYTO UR: NORMAL
LAB AP CASE REPORT: NORMAL
LAB AP CLINICAL INFORMATION: NORMAL
PATH REPORT.FINAL DX SPEC: NORMAL
PATH REPORT.GROSS SPEC: NORMAL

## 2022-04-27 ENCOUNTER — TELEPHONE (OUTPATIENT)
Dept: GASTROENTEROLOGY | Facility: CLINIC | Age: 32
End: 2022-04-27

## 2022-04-27 NOTE — TELEPHONE ENCOUNTER
Called pt. No answer. Pt's voicemail box has not been set up yet so I was unable to leave a message.     EGD Results/Plan --- Pt may f/u PRN      Addendum: Pt returned call. Aware of results.

## 2022-04-27 NOTE — TELEPHONE ENCOUNTER
----- Message from PERRI Moss sent at 4/21/2022  4:22 PM EDT -----  EGD appeared normal stomach biopsy also negative, patient may follow-up as needed

## 2022-05-17 ENCOUNTER — LAB (OUTPATIENT)
Dept: UROLOGY | Facility: CLINIC | Age: 32
End: 2022-05-17

## 2022-05-23 ENCOUNTER — LAB (OUTPATIENT)
Dept: UROLOGY | Facility: CLINIC | Age: 32
End: 2022-05-23

## 2022-05-23 DIAGNOSIS — Z30.2 STERILIZATION: Primary | ICD-10-CM

## 2022-06-24 ENCOUNTER — TRANSCRIBE ORDERS (OUTPATIENT)
Dept: ADMINISTRATIVE | Facility: HOSPITAL | Age: 32
End: 2022-06-24

## 2022-06-24 DIAGNOSIS — M51.36 DDD (DEGENERATIVE DISC DISEASE), LUMBAR: Primary | ICD-10-CM

## 2022-07-21 ENCOUNTER — HOSPITAL ENCOUNTER (OUTPATIENT)
Dept: MRI IMAGING | Facility: HOSPITAL | Age: 32
Discharge: HOME | End: 2022-07-21
Admitting: NURSE PRACTITIONER

## 2022-07-21 DIAGNOSIS — M51.36 DDD (DEGENERATIVE DISC DISEASE), LUMBAR: ICD-10-CM

## 2022-07-21 PROCEDURE — 72148 MRI LUMBAR SPINE W/O DYE: CPT

## 2022-07-26 ENCOUNTER — PREP FOR SURGERY (OUTPATIENT)
Dept: OTHER | Facility: HOSPITAL | Age: 32
End: 2022-07-26

## 2022-07-26 ENCOUNTER — OFFICE VISIT (OUTPATIENT)
Dept: NEUROSURGERY | Facility: CLINIC | Age: 32
End: 2022-07-26

## 2022-07-26 ENCOUNTER — LAB (OUTPATIENT)
Dept: LAB | Facility: HOSPITAL | Age: 32
End: 2022-07-26

## 2022-07-26 ENCOUNTER — TRANSCRIBE ORDERS (OUTPATIENT)
Dept: LAB | Facility: HOSPITAL | Age: 32
End: 2022-07-26

## 2022-07-26 VITALS — BODY MASS INDEX: 39.27 KG/M2 | WEIGHT: 306 LBS | HEIGHT: 74 IN

## 2022-07-26 DIAGNOSIS — I10 HYPERTENSION, ESSENTIAL: ICD-10-CM

## 2022-07-26 DIAGNOSIS — M48.061 SPINAL STENOSIS, LUMBAR REGION, WITHOUT NEUROGENIC CLAUDICATION: Primary | ICD-10-CM

## 2022-07-26 DIAGNOSIS — M54.16 LUMBAR RADICULOPATHY: ICD-10-CM

## 2022-07-26 DIAGNOSIS — M51.36 DEGENERATIVE DISC DISEASE, LUMBAR: ICD-10-CM

## 2022-07-26 DIAGNOSIS — E11.9 DIABETES MELLITUS WITHOUT COMPLICATION: ICD-10-CM

## 2022-07-26 DIAGNOSIS — E11.9 DIABETES MELLITUS WITHOUT COMPLICATION: Primary | ICD-10-CM

## 2022-07-26 LAB
ALBUMIN SERPL-MCNC: 4.4 G/DL (ref 3.5–5.2)
ALBUMIN/GLOB SERPL: 1.5 G/DL
ALP SERPL-CCNC: 20 U/L (ref 39–117)
ALT SERPL W P-5'-P-CCNC: 26 U/L (ref 1–41)
ANION GAP SERPL CALCULATED.3IONS-SCNC: 12 MMOL/L (ref 5–15)
AST SERPL-CCNC: 20 U/L (ref 1–40)
BASOPHILS # BLD AUTO: 0.05 10*3/MM3 (ref 0–0.2)
BASOPHILS NFR BLD AUTO: 0.6 % (ref 0–1.5)
BILIRUB SERPL-MCNC: 0.3 MG/DL (ref 0–1.2)
BUN SERPL-MCNC: 12 MG/DL (ref 6–20)
BUN/CREAT SERPL: 9.9 (ref 7–25)
CALCIUM SPEC-SCNC: 9.8 MG/DL (ref 8.6–10.5)
CHLORIDE SERPL-SCNC: 105 MMOL/L (ref 98–107)
CHOLEST SERPL-MCNC: 167 MG/DL (ref 0–200)
CO2 SERPL-SCNC: 25 MMOL/L (ref 22–29)
CREAT SERPL-MCNC: 1.21 MG/DL (ref 0.76–1.27)
DEPRECATED RDW RBC AUTO: 40.7 FL (ref 37–54)
EGFRCR SERPLBLD CKD-EPI 2021: 81.1 ML/MIN/1.73
EOSINOPHIL # BLD AUTO: 0.17 10*3/MM3 (ref 0–0.4)
EOSINOPHIL NFR BLD AUTO: 2 % (ref 0.3–6.2)
ERYTHROCYTE [DISTWIDTH] IN BLOOD BY AUTOMATED COUNT: 13.8 % (ref 12.3–15.4)
GLOBULIN UR ELPH-MCNC: 2.9 GM/DL
GLUCOSE SERPL-MCNC: 102 MG/DL (ref 65–99)
HBA1C MFR BLD: 5.6 % (ref 4.8–5.6)
HCT VFR BLD AUTO: 42.5 % (ref 37.5–51)
HDLC SERPL-MCNC: 42 MG/DL (ref 40–60)
HGB BLD-MCNC: 14 G/DL (ref 13–17.7)
IMM GRANULOCYTES # BLD AUTO: 0.03 10*3/MM3 (ref 0–0.05)
IMM GRANULOCYTES NFR BLD AUTO: 0.4 % (ref 0–0.5)
LDLC SERPL CALC-MCNC: 108 MG/DL (ref 0–100)
LDLC/HDLC SERPL: 2.55 {RATIO}
LYMPHOCYTES # BLD AUTO: 2.63 10*3/MM3 (ref 0.7–3.1)
LYMPHOCYTES NFR BLD AUTO: 31.4 % (ref 19.6–45.3)
MCH RBC QN AUTO: 27.2 PG (ref 26.6–33)
MCHC RBC AUTO-ENTMCNC: 32.9 G/DL (ref 31.5–35.7)
MCV RBC AUTO: 82.5 FL (ref 79–97)
MONOCYTES # BLD AUTO: 0.68 10*3/MM3 (ref 0.1–0.9)
MONOCYTES NFR BLD AUTO: 8.1 % (ref 5–12)
NEUTROPHILS NFR BLD AUTO: 4.82 10*3/MM3 (ref 1.7–7)
NEUTROPHILS NFR BLD AUTO: 57.5 % (ref 42.7–76)
NRBC BLD AUTO-RTO: 0 /100 WBC (ref 0–0.2)
PLATELET # BLD AUTO: 281 10*3/MM3 (ref 140–450)
PMV BLD AUTO: 9.4 FL (ref 6–12)
POTASSIUM SERPL-SCNC: 4.6 MMOL/L (ref 3.5–5.2)
PROT SERPL-MCNC: 7.3 G/DL (ref 6–8.5)
RBC # BLD AUTO: 5.15 10*6/MM3 (ref 4.14–5.8)
SODIUM SERPL-SCNC: 142 MMOL/L (ref 136–145)
TRIGL SERPL-MCNC: 90 MG/DL (ref 0–150)
VLDLC SERPL-MCNC: 17 MG/DL (ref 5–40)
WBC NRBC COR # BLD: 8.38 10*3/MM3 (ref 3.4–10.8)

## 2022-07-26 PROCEDURE — 83036 HEMOGLOBIN GLYCOSYLATED A1C: CPT

## 2022-07-26 PROCEDURE — 80053 COMPREHEN METABOLIC PANEL: CPT

## 2022-07-26 PROCEDURE — 36415 COLL VENOUS BLD VENIPUNCTURE: CPT

## 2022-07-26 PROCEDURE — 99214 OFFICE O/P EST MOD 30 MIN: CPT | Performed by: NURSE PRACTITIONER

## 2022-07-26 PROCEDURE — 80061 LIPID PANEL: CPT

## 2022-07-26 PROCEDURE — 85025 COMPLETE CBC W/AUTO DIFF WBC: CPT

## 2022-07-26 RX ORDER — CLINDAMYCIN PHOSPHATE 900 MG/50ML
900 INJECTION INTRAVENOUS ONCE
Status: CANCELLED | OUTPATIENT
Start: 2022-07-26 | End: 2022-07-26

## 2022-07-26 NOTE — PROGRESS NOTES
"Chief Complaint  Back Pain and Leg Pain    Subjective          Abdielkate Terrell who is a 32 y.o. year old male who presents to [facility] for worsening low back and left leg pain.     Patient presenting with concerns of low back and left leg pain, starting over two years ago. He was evaluated in our office in 2020. Referred to pain management where he has been receiving LESI since that time. These were beneficial, however have lost efficacy in the past 6 months. Left leg pain has significantly worsened.  Pt rates pain 10/10. Described as dull, aching, throbbing and burning. Pain does radiate into the left, hip, leg in a L5 distribution into the top of the foot. Pt has concerns of weakness. Pt denies problems with bowel and bladder.     Recent Interventions for Pain: Pain management which was initially helping, though is no longer beneficial. He has tried Gabapentin, but did not like the way this made him feel so he stopped this.     Prior Surgery: no    Nicotine use: former smoker    BMI: 39              Review of Systems   Musculoskeletal: Positive for arthralgias, back pain and gait problem.   Neurological: Positive for weakness and numbness.   All other systems reviewed and are negative.       Objective   Vital Signs:   Ht 188 cm (74.02\")   Wt (!) 139 kg (306 lb)   BMI 39.27 kg/m²       Physical Exam  Vitals reviewed.   Constitutional:       Appearance: Normal appearance.   Musculoskeletal:      Lumbar back: No tenderness. Negative right straight leg raise test and negative left straight leg raise test.      Right hip: No tenderness. Normal range of motion.      Left hip: No tenderness. Normal range of motion.   Neurological:      Mental Status: He is alert and oriented to person, place, and time.      Gait: Gait is intact.      Deep Tendon Reflexes: Strength normal.      Reflex Scores:       Patellar reflexes are 2+ on the right side and 2+ on the left side.       " Achilles reflexes are 2+ on the right side and 2+ on the left side.       Neurologic Exam     Mental Status   Oriented to person, place, and time.   Level of consciousness: alert    Motor Exam   Muscle bulk: normal  Overall muscle tone: normal    Strength   Strength 5/5 throughout.     Sensory Exam   Sensory deficit distribution on left: L5    Gait, Coordination, and Reflexes     Gait  Gait: normal    Reflexes   Right patellar: 2+  Left patellar: 2+  Right achilles: 2+  Left achilles: 2+  Right ankle clonus: absent  Left ankle clonus: absent       Result Review :       Data reviewed: Radiologic studies MRI Lumbar Spine on 7/21/22 at [facility] personally reviewed. At L4/5 there is DDD with a left sided disc bulge, combined with hypertrophic facet changes resulting in severe bilateral foraminal narrowing and moderately severe spinal canal stenosis.           Assessment and Plan    Diagnoses and all orders for this visit:    1. Spinal stenosis, lumbar region, without neurogenic claudication (Primary)    2. Lumbar radiculopathy    3. Degenerative disc disease, lumbar    Pt presenting with concerns worsening left leg pain in a L5 distribution. We reviewed his recent MRI lumbar Spine, demonstrating degenerative changes at L4/5 with moderately severe spinal canal stenosis. LESI are no longer as effective as they once were. Dr. Reis reviewed his MRI. Discussed left approach minimally invasive laminectomy and discectomy L4/5. Risks and benefits discussed. Pt wishes to proceed. We discussed that he should expect a 6 week recovery time with mobility restrictions including no lifting greater than 10 pounds, limit bending and twisting at the waist. He plans to file for short term disability through his work. He will follow up at post op.       Follow Up   Return for Post-op Follow up.  Patient was given instructions and counseling regarding his condition or for health maintenance advice.

## 2022-07-27 ENCOUNTER — TELEPHONE (OUTPATIENT)
Dept: FAMILY MEDICINE CLINIC | Facility: CLINIC | Age: 32
End: 2022-07-27

## 2022-07-27 NOTE — TELEPHONE ENCOUNTER
----- Message from PERRI Reynaga sent at 7/27/2022  3:00 PM EDT -----  Triglycerides improved hemoglobin A1c improved, normal stable labs, continue all current medications.

## 2022-08-08 ENCOUNTER — TELEMEDICINE (OUTPATIENT)
Dept: FAMILY MEDICINE CLINIC | Facility: TELEHEALTH | Age: 32
End: 2022-08-08

## 2022-08-08 DIAGNOSIS — L30.4 INTERTRIGO: Primary | ICD-10-CM

## 2022-08-08 PROCEDURE — 99213 OFFICE O/P EST LOW 20 MIN: CPT | Performed by: NURSE PRACTITIONER

## 2022-08-08 RX ORDER — FLUCONAZOLE 200 MG/1
200 TABLET ORAL WEEKLY
Qty: 4 TABLET | Refills: 0 | Status: SHIPPED | OUTPATIENT
Start: 2022-08-08 | End: 2022-08-30

## 2022-08-08 RX ORDER — CLOTRIMAZOLE 1 %
1 CREAM (GRAM) TOPICAL 2 TIMES DAILY
Qty: 60 G | Refills: 0 | Status: SHIPPED | OUTPATIENT
Start: 2022-08-08 | End: 2022-08-30

## 2022-08-08 NOTE — PATIENT INSTRUCTIONS
-Stop prednisone. Steroids will make the rash worse.  -Keep skin as dry as possible  -Continue goldbond powder for itching  -Wear loose cotton clothing, try to avoid tight waistbands  -Follow up with primary or dermatology as needed

## 2022-08-08 NOTE — PROGRESS NOTES
Lyssa Terrell is a 32 y.o. male.     He was treated by [facility] for poison oak/sumac with a prednisone shot and oral prednisone 4 days ago. He states the poison oak rash was mild but he went ahead and did the oral prednisone because he works out in the heat and is constantly sweaty. The rash is starting to dry up and is nearly resolved but a couple days ago he started having itching in his groin and belt line and a red moist rash appeared. The rash is now on his chest under his skin folds as well. He is diabetic but states blood sugars are usually 120 or less. He also states that he had a similar rash that occurred about a month ago but was not as bad and cleared up with steroids.       The following portions of the patient's history were reviewed and updated as appropriate: allergies, current medications, past family history, past medical history, past social history, past surgical history, and problem list.    Review of Systems   Constitutional: Negative.    HENT: Negative.    Respiratory: Negative.    Skin: Positive for color change and rash.       Objective   Physical Exam  Constitutional:       General: He is not in acute distress.     Appearance: He is well-developed. He is not diaphoretic.   Pulmonary:      Effort: Pulmonary effort is normal.   Skin:     Findings: Rash present.             Comments: Red moist hyper pigmentation   Neurological:      Mental Status: He is alert and oriented to person, place, and time.   Psychiatric:         Behavior: Behavior normal.           Assessment & Plan   Diagnoses and all orders for this visit:    1. Intertrigo (Primary)    Other orders  -     fluconazole (Diflucan) 200 MG tablet; Take 1 tablet by mouth 1 (One) Time Per Week for 4 doses.  Dispense: 4 tablet; Refill: 0  -     clotrimazole (LOTRIMIN) 1 % cream; Apply 1 application topically to the appropriate area as directed 2 (Two) Times a Day. Call [phone] with questions for provider  Dispense: 60 g;  Refill: 0      -Stop prednisone  -Follow up with PCP or derm if needed           The use of a video visit has been reviewed with the patient and verbal informed consent has been obtained. Myself and Abdiel Terrell participated in this visit. The patient is located in [place], [place]. I am located in [place], [place] mychart and Zoom were utilized. I spent 20 minutes in the patient's chart for this visit.

## 2022-08-15 ENCOUNTER — TELEPHONE (OUTPATIENT)
Dept: NEUROSURGERY | Facility: CLINIC | Age: 32
End: 2022-08-15

## 2022-08-15 NOTE — TELEPHONE ENCOUNTER
Caller: Abdiel Terrell    Relationship to patient: Self    Best call back number: [phone]  Patient is needing: PATIENT IS CALLING TO SEE IF PAPERWORK HAS BEEN RECEIVED IN OFFICE , SUSAN TOLD PATIENT THEY SENT IT TWO WEEKS AGO.      PLEASE CALL THE PATIENT TO ADVISE

## 2022-08-16 NOTE — TELEPHONE ENCOUNTER
Advised patient that we have not received paperwork yet. He states he has a copy and will try and send to office via [email or web address] to complete.

## 2022-08-30 ENCOUNTER — OFFICE VISIT (OUTPATIENT)
Dept: FAMILY MEDICINE CLINIC | Facility: CLINIC | Age: 32
End: 2022-08-30

## 2022-08-30 VITALS
SYSTOLIC BLOOD PRESSURE: 110 MMHG | HEART RATE: 88 BPM | DIASTOLIC BLOOD PRESSURE: 57 MMHG | OXYGEN SATURATION: 97 % | HEIGHT: 74 IN | WEIGHT: 300.4 LBS | TEMPERATURE: 98 F | BODY MASS INDEX: 38.55 KG/M2

## 2022-08-30 DIAGNOSIS — E11.9 TYPE 2 DIABETES MELLITUS WITHOUT COMPLICATION, WITHOUT LONG-TERM CURRENT USE OF INSULIN: Primary | ICD-10-CM

## 2022-08-30 DIAGNOSIS — K21.9 GASTROESOPHAGEAL REFLUX DISEASE WITHOUT ESOPHAGITIS: ICD-10-CM

## 2022-08-30 DIAGNOSIS — J30.2 SEASONAL ALLERGIES: ICD-10-CM

## 2022-08-30 PROCEDURE — 99214 OFFICE O/P EST MOD 30 MIN: CPT | Performed by: NURSE PRACTITIONER

## 2022-08-30 RX ORDER — LISINOPRIL 10 MG/1
10 TABLET ORAL DAILY
Qty: 30 TABLET | Refills: 5 | Status: SHIPPED | OUTPATIENT
Start: 2022-08-30 | End: 2023-02-28 | Stop reason: SDUPTHER

## 2022-08-30 RX ORDER — OMEPRAZOLE 20 MG/1
20 CAPSULE, DELAYED RELEASE ORAL DAILY
Qty: 30 CAPSULE | Refills: 5 | Status: SHIPPED | OUTPATIENT
Start: 2022-08-30 | End: 2023-02-28 | Stop reason: SDUPTHER

## 2022-08-30 RX ORDER — METFORMIN HYDROCHLORIDE 500 MG/1
500 TABLET, EXTENDED RELEASE ORAL 2 TIMES DAILY
Qty: 60 TABLET | Refills: 5 | Status: SHIPPED | OUTPATIENT
Start: 2022-08-30 | End: 2023-02-28 | Stop reason: SDUPTHER

## 2022-08-30 RX ORDER — LEVOCETIRIZINE DIHYDROCHLORIDE 5 MG/1
5 TABLET, FILM COATED ORAL DAILY
COMMUNITY

## 2022-08-30 NOTE — PROGRESS NOTES
Chief Complaint  Follow-up (4 months), Diabetes, Hypertension, Heartburn, and Allergies    SUBJECTIVE  Abdiel Terrell presents to [facility]     Diabetes Mellitus, type 2: Patient is taking Ozempic.  Patient is taking Lisinopril to protect his kidneys. Patient is compliant with medications.  Patient's last A1c is 5.6% on 7/26/22. Patient monitors blood sugar at home with average readings of 80s-120s.  Patient denies extreme high and low blood sugars.  Patient's last DM eye exam was 10/2021, he is already scheduled for 10/22.  Patient does not get DM foot exams.  Patient denies any unhealing sores. Patient attempts to monitor carbohydrate/ sugar intake in diet.     Gastroesophageal Reflux:  Patient is taking Omeprazole, with good control of symptoms.  Patient does not need over the counter medications for breakthrough symptoms.  Patient tries to avoid trigger foods, eat frequent small meals, not lie down within 2 hours of eating, avoids NSAIDS medications and alcohol.    Patient is scheduled for back surgery in October per Dr. HARRISON Reis.    History of Present Illness  Past Medical History:   Diagnosis Date   • Allergic 2012    Seasonal allergies   • Diabetes mellitus (HCC)    • GERD (gastroesophageal reflux disease)       Family History   Problem Relation Age of Onset   • Cancer Mother    • Cancer Father    • Cancer Son    • Colon cancer Neg Hx    • Malig Hyperthermia Neg Hx       Past Surgical History:   Procedure Laterality Date   • APPENDECTOMY     • COLONOSCOPY     • ENDOSCOPY N/A 04/18/2022    Procedure: ESOPHAGOGASTRODUODENOSCOPY WITH BIOPSIES;  Surgeon: Nadeem Kirk MD;  Location: [facility] ENDOSCOPY;  Service: Gastroenterology;  Laterality: N/A;  NORMAL EGD   • FEMUR SURGERY     • NASAL SEPTUM SURGERY     • TONSILLECTOMY     • UPPER GASTROINTESTINAL ENDOSCOPY     • VASECTOMY      3/11/22        Current Outpatient Medications:   •  aluminum chloride (Drysol) 20 % external  "solution, Apply 1 each topically to the appropriate area as directed Every Night., Disp: , Rfl:   •  levocetirizine (XYZAL) 5 MG tablet, Take 5 mg by mouth Every Evening., Disp: , Rfl:   •  lisinopril (PRINIVIL,ZESTRIL) 10 MG tablet, Take 1 tablet by mouth Daily., Disp: 30 tablet, Rfl: 5  •  metFORMIN ER (GLUCOPHAGE-XR) 500 MG 24 hr tablet, Take 1 tablet by mouth 2 (Two) Times a Day., Disp: 60 tablet, Rfl: 5  •  omeprazole (priLOSEC) 20 MG capsule, Take 1 capsule by mouth Daily., Disp: 30 capsule, Rfl: 5  •  Semaglutide,0.25 or 0.5MG/DOS, (Ozempic, 0.25 or 0.5 MG/DOSE,) 2 MG/1.5ML solution pen-injector, Inject 0.5 mg under the skin into the appropriate area as directed 1 (One) Time Per Week., Disp: 1 pen, Rfl: 5    OBJECTIVE  Vital Signs:   /57 (BP Location: Left arm, Patient Position: Sitting, Cuff Size: Adult)   Pulse 88   Temp 98 °F (36.7 °C) (Oral)   Ht 188 cm (74\")   Wt (!) 136 kg (300 lb 6.4 oz)   SpO2 97%   BMI 38.57 kg/m²    Estimated body mass index is 38.57 kg/m² as calculated from the following:    Height as of this encounter: 188 cm (74\").    Weight as of this encounter: 136 kg (300 lb 6.4 oz).     Wt Readings from Last 3 Encounters:   08/30/22 (!) 136 kg (300 lb 6.4 oz)   08/04/22 (!) 137 kg (303 lb)   07/26/22 (!) 139 kg (306 lb)     BP Readings from Last 3 Encounters:   08/30/22 110/57   08/04/22 132/98   06/24/22 119/76       Physical Exam  Vitals reviewed.   Constitutional:       Appearance: Normal appearance. He is well-developed.   HENT:      Head: Normocephalic and atraumatic.      Right Ear: External ear normal.      Left Ear: External ear normal.      Mouth/Throat:      Pharynx: No oropharyngeal exudate.   Eyes:      Conjunctiva/sclera: Conjunctivae normal.      Pupils: Pupils are equal, round, and reactive to light.   Cardiovascular:      Rate and Rhythm: Normal rate and regular rhythm.      Pulses:           Dorsalis pedis pulses are 2+ on the right side and 2+ on the left side. "      Heart sounds: No murmur heard.    No friction rub. No gallop.   Pulmonary:      Effort: Pulmonary effort is normal.      Breath sounds: Normal breath sounds. No wheezing or rhonchi.   Feet:      Right foot:      Protective Sensation: 3 sites tested. 3 sites sensed.      Skin integrity: Skin integrity normal. No ulcer or blister.      Toenail Condition: Right toenails are normal.      Left foot:      Protective Sensation: 3 sites tested. 3 sites sensed.      Skin integrity: Skin integrity normal. No ulcer or blister.      Toenail Condition: Left toenails are normal.      Comments:      Skin:     General: Skin is warm and dry.   Neurological:      Mental Status: He is alert and oriented to person, place, and time.      Cranial Nerves: No cranial nerve deficit.   Psychiatric:         Mood and Affect: Mood and affect normal.         Behavior: Behavior normal.         Thought Content: Thought content normal.         Judgment: Judgment normal.          Result Review    Common labs    Common Labsle 2/15/22 2/15/22 2/15/22 2/15/22 2/15/22 7/26/22 7/26/22 7/26/22 7/26/22    0953 [id number] 0953 0953 1016 0832 0832 0832 0832   Glucose   104 (A)     102 (A)    BUN   9     12    Creatinine   1.00     1.21    eGFR Non African Am   87         Sodium   140     142    Potassium   4.0     4.6    Chloride   106     105    Calcium   9.2     9.8    Albumin   4.20     4.40    Total Bilirubin   0.3     0.3    Alkaline Phosphatase   24 (A)     20 (A)    AST (SGOT)   22     20    ALT (SGPT)   33     26    WBC 7.39     8.38      Hemoglobin 13.9     14.0      Hematocrit 42.9     42.5      Platelets 270     281      Total Cholesterol    130     167   Triglycerides    178 (A)     90   HDL Cholesterol    32 (A)     42   LDL Cholesterol     68     108 (A)   Hemoglobin A1C  5.80 (A)     5.60     Microalbumin, Urine     1.4       (A) Abnormal value            CMP    CMP 2/15/22 7/26/22   Glucose 104 (A) 102 (A)   BUN 9 12   Creatinine 1.00 1.21    eGFR Non African Am 87    Sodium 140 142   Potassium 4.0 4.6   Chloride 106 105   Calcium 9.2 9.8   Albumin 4.20 4.40   Total Bilirubin 0.3 0.3   Alkaline Phosphatase 24 (A) 20 (A)   AST (SGOT) 22 20   ALT (SGPT) 33 26   (A) Abnormal value            CBC    CBC 2/15/22 7/26/22   WBC 7.39 8.38   RBC 5.21 5.15   Hemoglobin 13.9 14.0   Hematocrit 42.9 42.5   MCV 82.3 82.5   MCH 26.7 27.2   MCHC 32.4 32.9   RDW 13.9 13.8   Platelets 270 281           Lipid Panel    Lipid Panel 2/15/22 7/26/22   Total Cholesterol 130 167   Triglycerides 178 (A) 90   HDL Cholesterol 32 (A) 42   VLDL Cholesterol 30 17   LDL Cholesterol  68 108 (A)   LDL/HDL Ratio 1.95 2.55   (A) Abnormal value            TSH    TSH 2/15/22   TSH 0.773           Most Recent A1C    HGBA1C Most Recent 7/26/22   Hemoglobin A1C 5.60             MRI Lumbar Spine Without Contrast    Result Date: 7/21/2022   Multilevel degenerative disc disease.  The most abnormal level is L4-5 where there is neural foraminal and spinal canal stenosis.   ADELIA HARRISON MD       Electronically Signed and Approved By: ADELIA HARRISON MD on 7/21/2022 at 13:06                 The above data has been reviewed by PERRI Reynaga 08/30/2022 08:31 EDT.          Patient Care Team:  Atiya Hodge APRN as PCP - General (Family Medicine)  Nadeem Kirk MD as Consulting Physician (Gastroenterology)    Class 2 Severe Obesity (BMI >=35 and <=39.9). Obesity-related health conditions include the following: hypertension, diabetes mellitus, dyslipidemias and GERD. Obesity is improving with treatment. BMI is is above average; BMI management plan is completed. We discussed portion control and increasing exercise.       ASSESSMENT & PLAN    Diagnoses and all orders for this visit:    1. Type 2 diabetes mellitus without complication, without long-term current use of insulin (HCC) (Primary)  Comments:  Symptoms well controlled with current medication regimen, cont. Current meds.  Orders:  -      lisinopril (PRINIVIL,ZESTRIL) 10 MG tablet; Take 1 tablet by mouth Daily.  Dispense: 30 tablet; Refill: 5  -     metFORMIN ER (GLUCOPHAGE-XR) 500 MG 24 hr tablet; Take 1 tablet by mouth 2 (Two) Times a Day.  Dispense: 60 tablet; Refill: 5    2. Seasonal allergies  Comments:  Symptoms well controlled with current medication regimen, cont. Current meds.    3. Gastroesophageal reflux disease without esophagitis  Comments:  Symptoms well controlled with current medication regimen, cont. Current meds.  Orders:  -     omeprazole (priLOSEC) 20 MG capsule; Take 1 capsule by mouth Daily.  Dispense: 30 capsule; Refill: 5    Diabetic Foot Exam Performed and Monofilament Test Performed     Tobacco Use: High Risk   • Smoking Tobacco Use: Former Smoker   • Smokeless Tobacco Use: Current User       Follow Up     Return in about 6 months (around 2/28/2023).      Patient was given instructions and counseling regarding his condition or for health maintenance advice. Please see specific information pulled into the AVS if appropriate.   I have reviewed information obtained and documented by others and I have confirmed the accuracy of this documented note.    PERRI Reynaga

## 2022-10-06 PROBLEM — M48.061 SPINAL STENOSIS, LUMBAR REGION, WITHOUT NEUROGENIC CLAUDICATION: Status: ACTIVE | Noted: 2022-10-06

## 2022-10-17 NOTE — PRE-PROCEDURE INSTRUCTIONS
PATIENT INSTRUCTED TO BE:    - NPO AFTER MIDNIGHT EXCEPT CAN HAVE CLEAR LIQUIDS 2 HOURS PRIOR TO SURGERY ARRIVAL TIME     - TO HOLD ALL VITAMINS, SUPPLEMENTS, NSAIDS FOR ONE WEEK PRIOR TO THEIR SURGICAL PROCEDURE    - INSTRUCTED PT TO USE SURGICAL SOAP 1 TIME THE NIGHT PRIOR TO SURGERY OR THE AM OF SURGERY.   USE SOAP FROM NECK TO TOES AVOID THEIR FACE, HAIR, AND PRIVATE PARTS. INSTRUCTED NO LOTIONS, JEWELRY, PIERCINGS, OR DEODORANT DAY OF SURGERY    - IF DIABETIC, CHECK BLOOD GLUCOSE IF LESS THAN 70 CALL PREOP AREA -AM OF SURGERY [phone]    - INSTRUCTED TO TAKE THE FOLLOWING MEDICATIONS THE DAY OF SURGERY:   NONE  PATIENT VERBALIZED UNDERSTANDING

## 2022-10-21 ENCOUNTER — APPOINTMENT (OUTPATIENT)
Dept: GENERAL RADIOLOGY | Facility: HOSPITAL | Age: 32
End: 2022-10-21

## 2022-10-21 ENCOUNTER — ANESTHESIA EVENT (OUTPATIENT)
Dept: PERIOP | Facility: HOSPITAL | Age: 32
End: 2022-10-21

## 2022-10-21 ENCOUNTER — HOSPITAL ENCOUNTER (OUTPATIENT)
Facility: HOSPITAL | Age: 32
Setting detail: HOSPITAL OUTPATIENT SURGERY
Discharge: HOME | End: 2022-10-21
Attending: NEUROLOGICAL SURGERY | Admitting: NEUROLOGICAL SURGERY

## 2022-10-21 ENCOUNTER — ANESTHESIA (OUTPATIENT)
Dept: PERIOP | Facility: HOSPITAL | Age: 32
End: 2022-10-21

## 2022-10-21 VITALS
BODY MASS INDEX: 36.19 KG/M2 | HEART RATE: 89 BPM | RESPIRATION RATE: 20 BRPM | TEMPERATURE: 97.6 F | HEIGHT: 74 IN | WEIGHT: 281.97 LBS | OXYGEN SATURATION: 96 % | DIASTOLIC BLOOD PRESSURE: 76 MMHG | SYSTOLIC BLOOD PRESSURE: 119 MMHG

## 2022-10-21 DIAGNOSIS — M48.061 SPINAL STENOSIS, LUMBAR REGION, WITHOUT NEUROGENIC CLAUDICATION: ICD-10-CM

## 2022-10-21 LAB
GLUCOSE BLDC GLUCOMTR-MCNC: 90 MG/DL (ref 70–99)
GLUCOSE BLDC GLUCOMTR-MCNC: 98 MG/DL (ref 70–99)

## 2022-10-21 PROCEDURE — 25010000002 HYDROMORPHONE 1 MG/ML SOLUTION: Performed by: NURSE ANESTHETIST, CERTIFIED REGISTERED

## 2022-10-21 PROCEDURE — 63047 LAM FACETEC & FORAMOT LUMBAR: CPT | Performed by: SPECIALIST/TECHNOLOGIST, OTHER

## 2022-10-21 PROCEDURE — 25010000002 ONDANSETRON PER 1 MG: Performed by: NURSE ANESTHETIST, CERTIFIED REGISTERED

## 2022-10-21 PROCEDURE — 93010 ELECTROCARDIOGRAM REPORT: CPT | Performed by: INTERNAL MEDICINE

## 2022-10-21 PROCEDURE — S0260 H&P FOR SURGERY: HCPCS | Performed by: NEUROLOGICAL SURGERY

## 2022-10-21 PROCEDURE — 76000 FLUOROSCOPY <1 HR PHYS/QHP: CPT

## 2022-10-21 PROCEDURE — 93005 ELECTROCARDIOGRAM TRACING: CPT | Performed by: NEUROLOGICAL SURGERY

## 2022-10-21 PROCEDURE — 25010000002 FENTANYL CITRATE (PF) 50 MCG/ML SOLUTION: Performed by: NURSE ANESTHETIST, CERTIFIED REGISTERED

## 2022-10-21 PROCEDURE — 25010000002 PROPOFOL 10 MG/ML EMULSION: Performed by: NURSE ANESTHETIST, CERTIFIED REGISTERED

## 2022-10-21 PROCEDURE — 25010000002 DEXAMETHASONE PER 1 MG: Performed by: NURSE ANESTHETIST, CERTIFIED REGISTERED

## 2022-10-21 PROCEDURE — 82962 GLUCOSE BLOOD TEST: CPT

## 2022-10-21 PROCEDURE — 63047 LAM FACETEC & FORAMOT LUMBAR: CPT | Performed by: NEUROLOGICAL SURGERY

## 2022-10-21 PROCEDURE — 25010000002 MIDAZOLAM PER 1 MG: Performed by: ANESTHESIOLOGY

## 2022-10-21 PROCEDURE — 0 MEPERIDINE PER 100 MG: Performed by: NURSE ANESTHETIST, CERTIFIED REGISTERED

## 2022-10-21 RX ORDER — PROPOFOL 10 MG/ML
VIAL (ML) INTRAVENOUS AS NEEDED
Status: DISCONTINUED | OUTPATIENT
Start: 2022-10-21 | End: 2022-10-21 | Stop reason: SURG

## 2022-10-21 RX ORDER — OXYCODONE HYDROCHLORIDE AND ACETAMINOPHEN 5; 325 MG/1; MG/1
1 TABLET ORAL EVERY 4 HOURS PRN
Qty: 25 TABLET | Refills: 0 | Status: SHIPPED | OUTPATIENT
Start: 2022-10-21 | End: 2022-11-10

## 2022-10-21 RX ORDER — SODIUM CHLORIDE, SODIUM LACTATE, POTASSIUM CHLORIDE, CALCIUM CHLORIDE 600; 310; 30; 20 MG/100ML; MG/100ML; MG/100ML; MG/100ML
9 INJECTION, SOLUTION INTRAVENOUS CONTINUOUS PRN
Status: DISCONTINUED | OUTPATIENT
Start: 2022-10-21 | End: 2022-10-21 | Stop reason: HOSPADM

## 2022-10-21 RX ORDER — DEXAMETHASONE SODIUM PHOSPHATE 4 MG/ML
INJECTION, SOLUTION INTRA-ARTICULAR; INTRALESIONAL; INTRAMUSCULAR; INTRAVENOUS; SOFT TISSUE AS NEEDED
Status: DISCONTINUED | OUTPATIENT
Start: 2022-10-21 | End: 2022-10-21 | Stop reason: SURG

## 2022-10-21 RX ORDER — MIDAZOLAM HYDROCHLORIDE 1 MG/ML
2 INJECTION INTRAMUSCULAR; INTRAVENOUS ONCE
Status: COMPLETED | OUTPATIENT
Start: 2022-10-21 | End: 2022-10-21

## 2022-10-21 RX ORDER — BUPIVACAINE HYDROCHLORIDE AND EPINEPHRINE 5; 5 MG/ML; UG/ML
INJECTION, SOLUTION EPIDURAL; INTRACAUDAL; PERINEURAL AS NEEDED
Status: DISCONTINUED | OUTPATIENT
Start: 2022-10-21 | End: 2022-10-21 | Stop reason: HOSPADM

## 2022-10-21 RX ORDER — ROCURONIUM BROMIDE 10 MG/ML
INJECTION, SOLUTION INTRAVENOUS AS NEEDED
Status: DISCONTINUED | OUTPATIENT
Start: 2022-10-21 | End: 2022-10-21 | Stop reason: SURG

## 2022-10-21 RX ORDER — ONDANSETRON 2 MG/ML
INJECTION INTRAMUSCULAR; INTRAVENOUS AS NEEDED
Status: DISCONTINUED | OUTPATIENT
Start: 2022-10-21 | End: 2022-10-21 | Stop reason: SURG

## 2022-10-21 RX ORDER — GLYCOPYRROLATE 0.2 MG/ML
0.2 INJECTION INTRAMUSCULAR; INTRAVENOUS
Status: COMPLETED | OUTPATIENT
Start: 2022-10-21 | End: 2022-10-21

## 2022-10-21 RX ORDER — OXYCODONE HYDROCHLORIDE 5 MG/1
5 TABLET ORAL
Status: DISCONTINUED | OUTPATIENT
Start: 2022-10-21 | End: 2022-10-21

## 2022-10-21 RX ORDER — PROMETHAZINE HYDROCHLORIDE 25 MG/1
25 SUPPOSITORY RECTAL ONCE AS NEEDED
Status: DISCONTINUED | OUTPATIENT
Start: 2022-10-21 | End: 2022-10-21 | Stop reason: HOSPADM

## 2022-10-21 RX ORDER — ONDANSETRON 2 MG/ML
4 INJECTION INTRAMUSCULAR; INTRAVENOUS ONCE AS NEEDED
Status: DISCONTINUED | OUTPATIENT
Start: 2022-10-21 | End: 2022-10-21 | Stop reason: HOSPADM

## 2022-10-21 RX ORDER — FENTANYL CITRATE 50 UG/ML
INJECTION, SOLUTION INTRAMUSCULAR; INTRAVENOUS AS NEEDED
Status: DISCONTINUED | OUTPATIENT
Start: 2022-10-21 | End: 2022-10-21 | Stop reason: SURG

## 2022-10-21 RX ORDER — PROMETHAZINE HYDROCHLORIDE 12.5 MG/1
25 TABLET ORAL ONCE AS NEEDED
Status: DISCONTINUED | OUTPATIENT
Start: 2022-10-21 | End: 2022-10-21 | Stop reason: HOSPADM

## 2022-10-21 RX ORDER — ACETAMINOPHEN 500 MG
1000 TABLET ORAL ONCE
Status: COMPLETED | OUTPATIENT
Start: 2022-10-21 | End: 2022-10-21

## 2022-10-21 RX ORDER — MAGNESIUM HYDROXIDE 1200 MG/15ML
LIQUID ORAL AS NEEDED
Status: DISCONTINUED | OUTPATIENT
Start: 2022-10-21 | End: 2022-10-21 | Stop reason: HOSPADM

## 2022-10-21 RX ORDER — MEPERIDINE HYDROCHLORIDE 25 MG/ML
12.5 INJECTION INTRAMUSCULAR; INTRAVENOUS; SUBCUTANEOUS
Status: DISCONTINUED | OUTPATIENT
Start: 2022-10-21 | End: 2022-10-21 | Stop reason: HOSPADM

## 2022-10-21 RX ORDER — LIDOCAINE HYDROCHLORIDE 20 MG/ML
INJECTION, SOLUTION EPIDURAL; INFILTRATION; INTRACAUDAL; PERINEURAL AS NEEDED
Status: DISCONTINUED | OUTPATIENT
Start: 2022-10-21 | End: 2022-10-21 | Stop reason: SURG

## 2022-10-21 RX ORDER — CLINDAMYCIN PHOSPHATE 900 MG/50ML
900 INJECTION INTRAVENOUS ONCE
Status: COMPLETED | OUTPATIENT
Start: 2022-10-21 | End: 2022-10-21

## 2022-10-21 RX ADMIN — GLYCOPYRROLATE 0.2 MG: 0.2 INJECTION INTRAMUSCULAR; INTRAVENOUS at 10:00

## 2022-10-21 RX ADMIN — HYDROMORPHONE HYDROCHLORIDE 0.5 MG: 1 INJECTION, SOLUTION INTRAMUSCULAR; INTRAVENOUS; SUBCUTANEOUS at 11:56

## 2022-10-21 RX ADMIN — ROCURONIUM BROMIDE 50 MG: 10 INJECTION INTRAVENOUS at 10:12

## 2022-10-21 RX ADMIN — DEXAMETHASONE SODIUM PHOSPHATE 4 MG: 4 INJECTION, SOLUTION INTRA-ARTICULAR; INTRALESIONAL; INTRAMUSCULAR; INTRAVENOUS; SOFT TISSUE at 10:12

## 2022-10-21 RX ADMIN — SODIUM CHLORIDE, POTASSIUM CHLORIDE, SODIUM LACTATE AND CALCIUM CHLORIDE: 600; 310; 30; 20 INJECTION, SOLUTION INTRAVENOUS at 11:23

## 2022-10-21 RX ADMIN — LIDOCAINE HYDROCHLORIDE 60 MG: 20 INJECTION, SOLUTION EPIDURAL; INFILTRATION; INTRACAUDAL; PERINEURAL at 10:11

## 2022-10-21 RX ADMIN — SUGAMMADEX 200 MG: 100 INJECTION, SOLUTION INTRAVENOUS at 11:11

## 2022-10-21 RX ADMIN — CLINDAMYCIN IN 5 PERCENT DEXTROSE 900 MG: 18 INJECTION, SOLUTION INTRAVENOUS at 10:20

## 2022-10-21 RX ADMIN — FENTANYL CITRATE 50 MCG: 50 INJECTION, SOLUTION INTRAMUSCULAR; INTRAVENOUS at 11:25

## 2022-10-21 RX ADMIN — FENTANYL CITRATE 100 MCG: 50 INJECTION, SOLUTION INTRAMUSCULAR; INTRAVENOUS at 10:11

## 2022-10-21 RX ADMIN — SODIUM CHLORIDE, POTASSIUM CHLORIDE, SODIUM LACTATE AND CALCIUM CHLORIDE 9 ML/HR: 600; 310; 30; 20 INJECTION, SOLUTION INTRAVENOUS at 09:59

## 2022-10-21 RX ADMIN — MEPERIDINE HYDROCHLORIDE 12.5 MG: 25 INJECTION INTRAMUSCULAR; INTRAVENOUS; SUBCUTANEOUS at 12:20

## 2022-10-21 RX ADMIN — ACETAMINOPHEN 1000 MG: 500 TABLET, FILM COATED ORAL at 09:55

## 2022-10-21 RX ADMIN — FENTANYL CITRATE 50 MCG: 50 INJECTION, SOLUTION INTRAMUSCULAR; INTRAVENOUS at 11:00

## 2022-10-21 RX ADMIN — ONDANSETRON 4 MG: 2 INJECTION INTRAMUSCULAR; INTRAVENOUS at 11:11

## 2022-10-21 RX ADMIN — MIDAZOLAM 2 MG: 1 INJECTION, SOLUTION INTRAMUSCULAR; INTRAVENOUS at 10:00

## 2022-10-21 RX ADMIN — HYDROMORPHONE HYDROCHLORIDE 0.5 MG: 1 INJECTION, SOLUTION INTRAMUSCULAR; INTRAVENOUS; SUBCUTANEOUS at 11:50

## 2022-10-21 RX ADMIN — PROPOFOL 200 MG: 10 INJECTION, EMULSION INTRAVENOUS at 10:11

## 2022-10-21 NOTE — ANESTHESIA PREPROCEDURE EVALUATION
Anesthesia Evaluation     Patient summary reviewed and Nursing notes reviewed   no history of anesthetic complications:  NPO Solid Status: > 8 hours  NPO Liquid Status: > 2 hours           Airway   Mallampati: II  TM distance: >3 FB  Neck ROM: full  No difficulty expected  Dental      Pulmonary - normal exam    breath sounds clear to auscultation  (+) a smoker Former,   Cardiovascular - negative cardio ROS and normal exam  Exercise tolerance: good (4-7 METS)    ECG reviewed  Rhythm: regular  Rate: normal        Neuro/Psych  (+) psychiatric history Anxiety,    GI/Hepatic/Renal/Endo    (+) obesity,  GERD,  diabetes mellitus type 2,     Musculoskeletal (-) negative ROS    Abdominal    Substance History - negative use     OB/GYN negative ob/gyn ROS         Other - negative ROS       ROS/Med Hx Other: PAT Nursing Notes unavailable.                   Anesthesia Plan    ASA 2     general     (Patient understands anesthesia not responsible for dental damage.)  intravenous induction     Anesthetic plan, risks, benefits, and alternatives have been provided, discussed and informed consent has been obtained with: patient.    Use of blood products discussed with patient .   Plan discussed with CRNA.        CODE STATUS:

## 2022-10-21 NOTE — OP NOTE
LUMBAR LAMINECTOMY DISCECTOMY MINIMALLY INVASIVE  Procedure Report    Patient Name:  Abdiel Terrell  YOB: 1990    Date of Surgery:  10/21/2022     Indications: Lumbar spinal stenosis with disc herniation and radiculopathy on the left.  Failure to respond to conservative interventions.    Pre-op Diagnosis:   Spinal stenosis, lumbar region, without neurogenic claudication [M48.061]       Post-Op Diagnosis Codes:     * Spinal stenosis, lumbar region, without neurogenic claudication [M48.061]    Procedure/CPT® Codes:      Procedure(s):  MINIMALLY INVASIVE LUMBAR LAMINECTOMY AND DISCECTOMY, LEFT APPROACH LUMBAR 4-LUMBAR 5    Staff:  Surgeon(s):  Rex Reis MD    Assistant: Yumiko Leblanc; Rosaura Elliott RN CSA    Anesthesia: General    Estimated Blood Loss: 30 mL      Specimen:          Disc (none to pathology)        Findings: Lateral recess stenosis with L4-5 disc herniation    Complications: No intraoperative complications    Description of Procedure: After informed consent was obtained, the patient was brought to the operating room. After the induction of adequate general endotracheal anesthesia, they were place in the prone position on the Neil frame. All pressure points were padded. The back was prepped and draped in the typical fashion. A timeout was performed. The midline was marked and a line roughly 2.5 cms off the midline to the left was drawn. The C-arm and a spinal needle were used to localize the L4-5 level. A 2 cm incision was then made and the Boss tubular retractor system was used to dilate the tissue docking at L4-5. The level was confirmed with fluoroscopy. The microscope was brought in and used for the remainder of the case for improved magnification and illumination. The lamina was cleared of soft tissue and removed to above the insertion of the ligamentum flavum. The ligament was resected inferiorly and laterally to the edge of the nerve root.  The L5 nerve root was  mobilized medially and retracted by my assistant.  The ligament was seen to be quite prominent.  This was incised using 11 blade scalpel.  Disc material was then removed.  One large fragment was removed as well as a couple smaller fragments.  After this the ball probe passed very freely under the L5 nerve root.  The tubular retractor was then tilted medially to allow exposure of the patient's right lateral recess.  The ligament was undercut.  There is not any significant stenosis on the patient's right side.  Hemostasis was obtained using bone wax as well as the bipolar electrocautery.  The wound was irrigated normal saline.  The tubular retractor was removed and hemostasis assured in the soft tissue. The fascia was reapproximated using a 0 Vicryl and the the skin edges were reapproximated using a subcutaneous 2-0 Vicryl. The wound was dressed with mastisol, steri-strips, Telfa and Tegaderm. All sponge and needle counts were correct. The patient received a dose of preoperative antibiotics.     Assistant: Yumiko Leblanc Kimberly L, RN CSA  was responsible for performing the following activities: Retraction, Suction, Irrigation, Closing and Placing Dressing and their skilled assistance was necessary for the success of this case.    Rex Reis MD     Date: 10/21/2022  Time: 11:28 EDT

## 2022-10-21 NOTE — H&P
[facility]   HISTORY AND PHYSICAL    Patient Name: Abdiel Terrell  : 1990  MRN: [id number]  Primary Care Physician:  Atiya Hodge APRN  Date of admission: 10/21/2022    Subjective   Subjective     Chief Complaint: Left leg pain    History of Present Illness  Left leg pain with lumbar 4-lumbar 5 stenosis and disc protrusion.  He has failed conservative interventions.  He is opted for decompression in an attempt to help the leg pain.      Review of Systems   Musculoskeletal: Positive for back pain ( Left leg pain).        Personal History     Past Medical History:   Diagnosis Date   • Anxiety    • Diabetes mellitus (HCC)     type II, average  or below   • GERD (gastroesophageal reflux disease)    • Lower back pain        Past Surgical History:   Procedure Laterality Date   • APPENDECTOMY     • COLONOSCOPY     • ENDOSCOPY N/A 2022    Procedure: ESOPHAGOGASTRODUODENOSCOPY WITH BIOPSIES;  Surgeon: Nadeem Kirk MD;  Location: [facility] ENDOSCOPY;  Service: Gastroenterology;  Laterality: N/A;  NORMAL EGD   • FEMUR SURGERY Right     x2   • NASAL SEPTUM SURGERY     • TONSILLECTOMY     • UPPER GASTROINTESTINAL ENDOSCOPY     • VASECTOMY      3/11/22       Family History: family history includes Cancer in his father, mother, and son. Otherwise pertinent FHx was reviewed and not pertinent to current issue.    Social History:  reports that he quit smoking about 5 years ago. His smoking use included cigarettes. He started smoking about 18 years ago. He has a 13.00 pack-year smoking history. His smokeless tobacco use includes chew. He reports current alcohol use of about 2.0 standard drinks per week. He reports that he does not use drugs.    Home Medications:  Semaglutide(0.25 or 0.5MG/DOS), aluminum chloride, levocetirizine, lisinopril, metFORMIN ER, and omeprazole    Allergies:  Allergies   Allergen Reactions   • Morphine Hives   • Penicillins Hives       Objective    Objective     Vitals:    Temp:  [97.5 °F (36.4 °C)] 97.5 °F (36.4 °C)  Heart Rate:  [84] 84  Resp:  [18] 18  BP: (141)/(71) 141/71    Physical Exam  Constitutional:       Comments: BMI 36   Cardiovascular:      Comments: No edema  Pulmonary:      Effort: Pulmonary effort is normal.   Neurological:      Mental Status: He is alert.   Psychiatric:         Mood and Affect: Mood normal.           Assessment & Plan   Assessment / Plan     Brief Patient Summary:  Abdiel Terrell is a 32 y.o. male who has left leg pain with lumbar spinal stenosis and a disc protrusion at L4-5.    Active Hospital Problems:  Active Hospital Problems    Diagnosis    • **Spinal stenosis, lumbar region, without neurogenic claudication      Added automatically from request for surgery [id number]       Plan:   OR today for left approach for minimally invasive laminectomy with discectomy, lumbar 4-lumbar 5.  Risks and benefits discussed with patient.  Desires to proceed.    DVT prophylaxis:  Mechanical DVT prophylaxis orders are present.    CODE STATUS:       Admission Status:  I believe this patient meets outpatient status.    Rex Reis MD

## 2022-10-21 NOTE — DISCHARGE INSTRUCTIONS
DISCHARGE INSTRUCTIONS  DISCECTOMY/ LAMINECTOMY  [] MINIMALLY INVASIVE      For your surgery you had:  General anesthesia (you may have a sore throat for the first 24 hours)  You may experience dizziness, drowsiness, or light-headedness for several hours following surgery  Do not stay alone today or tonight.  Limit your activity for 24 hours.  You should not drive, operate machinery, drink alcohol, or sign legally binding documents for 24 hours or while you are taking pain medication.    Activity  For the first two weeks following surgery, remain close to home and do not do any lifting, bending or strenuous activity.  Walking is the best exercise and you can increase the amount you walk as you feel like it.  Riding in a car for short distances (15-20 minutes) is okay but do not drive until you are off all narcotic medications.  If you have a sedentary job, you may resume work as soon as you feel like it (this is rarely less than one week).    Pain Control  Take your pain medicines as needed and as prescribed.  As you are feeling better, you can decrease the prescribed pain medication and take over-the-counter medications such as Tylenol or Ibuprofen.  The prescribed pain medicines tend to be constipating so it is important to eat a well-balanced diet and take a stool softener if recommended by the doctor.  Activity such as walking also helps keep your bowels regular.   Incision Care  Your sutures are under the skin and will dissolve in time.  You may shower as soon as you like.    [x] You have a clear dressing, which you should remove in 3-4 days.  Beneath this dressing are steri-strips that will peel off over time.  If these steri-strips have not peeled off in 10-14 days, you may remove them.  Gently washing your incision with mild soap and rinsing with water during your shower is all you need to do to care for the incision.  Do not scrub the incision or sit in the bathtub.  Check your incision site each day to  see how it looks.  Some redness and bruising is normal for the first few days.  NOTIFY YOUR DOCTOR IF YOU EXPERIENCE ANY OF THE FOLLOWING:   You have a fever over 100.8o Fahrenheit orally  Shaking chills  Your incision is red, hot to touch, or has excessive drainage  Your incision starts to separate  Increase in bleeding or bleeding that is excessive  Nausea, vomiting and/or pain not controlled by prescribed medications  Unable to urinate in 6 hours after surgery  You may contact 's clinic at [phone] with any questions or concerns.  If unable to reach your doctor, please go to the closest emergency room.    Last dose of pain medication was given at:    Tylenol (1000mg) last at 10am. Do not exceed 4000mg of tylenol in a 24 hour period.  May take ibuprofen at any time if able and needed.  May take percocet next at any time if needed.

## 2022-10-24 ENCOUNTER — TELEPHONE (OUTPATIENT)
Dept: NEUROSURGERY | Facility: CLINIC | Age: 32
End: 2022-10-24

## 2022-10-24 NOTE — TELEPHONE ENCOUNTER
Patient request a call back regarding medication that was given to him after surgery.   Best call back number: [phone]

## 2022-10-25 RX ORDER — MELOXICAM 15 MG/1
15 TABLET ORAL DAILY
Qty: 15 TABLET | Refills: 0 | Status: SHIPPED | OUTPATIENT
Start: 2022-10-25 | End: 2023-02-28

## 2022-10-28 LAB — QT INTERVAL: 353 MS

## 2022-10-31 DIAGNOSIS — E11.9 TYPE 2 DIABETES MELLITUS WITHOUT COMPLICATION, WITHOUT LONG-TERM CURRENT USE OF INSULIN: ICD-10-CM

## 2022-10-31 RX ORDER — SEMAGLUTIDE 1.34 MG/ML
0.5 INJECTION, SOLUTION SUBCUTANEOUS WEEKLY
Qty: 4.5 ML | Refills: 1 | Status: SHIPPED | OUTPATIENT
Start: 2022-10-31 | End: 2023-02-07 | Stop reason: DRUGHIGH

## 2022-11-10 ENCOUNTER — OFFICE VISIT (OUTPATIENT)
Dept: NEUROSURGERY | Facility: CLINIC | Age: 32
End: 2022-11-10

## 2022-11-10 VITALS
HEART RATE: 101 BPM | DIASTOLIC BLOOD PRESSURE: 68 MMHG | HEIGHT: 74 IN | BODY MASS INDEX: 35.94 KG/M2 | WEIGHT: 280 LBS | SYSTOLIC BLOOD PRESSURE: 106 MMHG

## 2022-11-10 DIAGNOSIS — Z98.890 S/P LUMBAR DISCECTOMY: Primary | ICD-10-CM

## 2022-11-10 DIAGNOSIS — M48.061 SPINAL STENOSIS, LUMBAR REGION, WITHOUT NEUROGENIC CLAUDICATION: ICD-10-CM

## 2022-11-10 DIAGNOSIS — M51.36 DEGENERATIVE DISC DISEASE, LUMBAR: ICD-10-CM

## 2022-11-10 PROCEDURE — 99024 POSTOP FOLLOW-UP VISIT: CPT | Performed by: NURSE PRACTITIONER

## 2022-11-10 NOTE — PROGRESS NOTES
"Chief Complaint  Post-op    Subjective          Abdiel Terrell who is a 32 y.o. year old male who presents to [facility] 3 weeks s/p left MID and MIL L4/5 10/21/22    History of Present Illness  Pt has appreciated improvement in extremity pain. He has mild pain in the low back. Rates pain 1/10 today.  Pt is no longer taking any medications.     Incision is healing well. No warmth, erythema, or edema. No fever or chills.     He is slowly increasing activity. Starting to walk more. He works a labor intensive job.             Review of Systems   Musculoskeletal: Positive for back pain.   All other systems reviewed and are negative.       Objective   Vital Signs:   /68 (BP Location: Right arm, Patient Position: Sitting, Cuff Size: Large Adult)   Pulse 101   Ht 188 cm (74\")   Wt 127 kg (280 lb)   BMI 35.95 kg/m²       Physical Exam  Vitals reviewed.   Constitutional:       Appearance: Normal appearance.   Skin:         Neurological:      Mental Status: He is alert and oriented to person, place, and time.      Motor: Motor strength is normal.      Gait: Gait is intact.        Neurologic Exam     Mental Status   Oriented to person, place, and time.   Level of consciousness: alert    Motor Exam   Muscle bulk: normal  Overall muscle tone: normal    Strength   Strength 5/5 throughout.     Sensory Exam   Light touch normal.     Gait, Coordination, and Reflexes     Gait  Gait: normal       Result Review :                 Assessment and Plan    Diagnoses and all orders for this visit:    1. S/P lumbar discectomy (Primary)    2. Spinal stenosis, lumbar region, without neurogenic claudication    3. Degenerative disc disease, lumbar    Pt is doing well s/p surgery. We discussed mobility restrictions x3 more weeks. Advised to use ice as needed for back pain. He will plan to return to work at 6 weeks post op as scheduled. He will follow up as needed.       Follow Up   Return if " symptoms worsen or fail to improve.  Patient was given instructions and counseling regarding his condition or for health maintenance advice.     -No lifting greater than 10 pounds, limit bending and twisting at the waist x3 more weeks  -Ice to back as needed, 20 minutes on 20 minutes off  -Follow up as needed

## 2022-11-10 NOTE — PATIENT INSTRUCTIONS
-No lifting greater than 10 pounds, limit bending and twisting at the waist x3 more weeks  -Ice to back as needed, 20 minutes on 20 minutes off  -Follow up as needed

## 2022-12-06 PROCEDURE — U0004 COV-19 TEST NON-CDC HGH THRU: HCPCS | Performed by: EMERGENCY MEDICINE

## 2022-12-25 DIAGNOSIS — R61 HYPERHIDROSIS: Primary | ICD-10-CM

## 2022-12-28 RX ORDER — ALUMINUM CHLORIDE 20 %
1 SOLUTION, NON-ORAL TOPICAL NIGHTLY
Qty: 35 ML | Refills: 1 | Status: SHIPPED | OUTPATIENT
Start: 2022-12-28

## 2023-02-06 ENCOUNTER — TELEPHONE (OUTPATIENT)
Dept: FAMILY MEDICINE CLINIC | Facility: CLINIC | Age: 33
End: 2023-02-06
Payer: COMMERCIAL

## 2023-02-06 NOTE — TELEPHONE ENCOUNTER
Patient called stating Ozempic 0.5mg is on national back order and he has been out since mid December.  Patient states his pharmacy told him that they have Ozempic 1mg in stock and to request a new prescriptions from his PCP for the increased dose.

## 2023-02-20 ENCOUNTER — TRANSCRIBE ORDERS (OUTPATIENT)
Dept: LAB | Facility: HOSPITAL | Age: 33
End: 2023-02-20
Payer: COMMERCIAL

## 2023-02-20 ENCOUNTER — LAB (OUTPATIENT)
Dept: LAB | Facility: HOSPITAL | Age: 33
End: 2023-02-20
Payer: COMMERCIAL

## 2023-02-20 DIAGNOSIS — E78.5 HYPERLIPIDEMIA, UNSPECIFIED HYPERLIPIDEMIA TYPE: ICD-10-CM

## 2023-02-20 DIAGNOSIS — E11.9 DIABETES MELLITUS WITHOUT COMPLICATION: Primary | ICD-10-CM

## 2023-02-20 DIAGNOSIS — E11.9 DIABETES MELLITUS WITHOUT COMPLICATION: ICD-10-CM

## 2023-02-20 LAB
ALBUMIN SERPL-MCNC: 4.3 G/DL (ref 3.5–5.2)
ALBUMIN UR-MCNC: 2.8 MG/DL
ALBUMIN/GLOB SERPL: 1.5 G/DL
ALP SERPL-CCNC: 16 U/L (ref 39–117)
ALT SERPL W P-5'-P-CCNC: 46 U/L (ref 1–41)
ANION GAP SERPL CALCULATED.3IONS-SCNC: 10.6 MMOL/L (ref 5–15)
AST SERPL-CCNC: 19 U/L (ref 1–40)
BASOPHILS # BLD AUTO: 0.03 10*3/MM3 (ref 0–0.2)
BASOPHILS NFR BLD AUTO: 0.6 % (ref 0–1.5)
BILIRUB SERPL-MCNC: 0.4 MG/DL (ref 0–1.2)
BUN SERPL-MCNC: 14 MG/DL (ref 6–20)
BUN/CREAT SERPL: 13.6 (ref 7–25)
CALCIUM SPEC-SCNC: 10 MG/DL (ref 8.6–10.5)
CHLORIDE SERPL-SCNC: 105 MMOL/L (ref 98–107)
CHOLEST SERPL-MCNC: 127 MG/DL (ref 0–200)
CO2 SERPL-SCNC: 24.4 MMOL/L (ref 22–29)
CREAT SERPL-MCNC: 1.03 MG/DL (ref 0.76–1.27)
DEPRECATED RDW RBC AUTO: 39.9 FL (ref 37–54)
EGFRCR SERPLBLD CKD-EPI 2021: 98.4 ML/MIN/1.73
EOSINOPHIL # BLD AUTO: 0.08 10*3/MM3 (ref 0–0.4)
EOSINOPHIL NFR BLD AUTO: 1.5 % (ref 0.3–6.2)
ERYTHROCYTE [DISTWIDTH] IN BLOOD BY AUTOMATED COUNT: 13.9 % (ref 12.3–15.4)
GLOBULIN UR ELPH-MCNC: 2.9 GM/DL
GLUCOSE SERPL-MCNC: 102 MG/DL (ref 65–99)
HBA1C MFR BLD: 5.8 % (ref 4.8–5.6)
HCT VFR BLD AUTO: 41.3 % (ref 37.5–51)
HDLC SERPL-MCNC: 34 MG/DL (ref 40–60)
HGB BLD-MCNC: 13.7 G/DL (ref 13–17.7)
IMM GRANULOCYTES # BLD AUTO: 0.02 10*3/MM3 (ref 0–0.05)
IMM GRANULOCYTES NFR BLD AUTO: 0.4 % (ref 0–0.5)
LDLC SERPL CALC-MCNC: 78 MG/DL (ref 0–100)
LDLC/HDLC SERPL: 2.29 {RATIO}
LYMPHOCYTES # BLD AUTO: 2.19 10*3/MM3 (ref 0.7–3.1)
LYMPHOCYTES NFR BLD AUTO: 40.5 % (ref 19.6–45.3)
MCH RBC QN AUTO: 26.1 PG (ref 26.6–33)
MCHC RBC AUTO-ENTMCNC: 33.2 G/DL (ref 31.5–35.7)
MCV RBC AUTO: 78.7 FL (ref 79–97)
MONOCYTES # BLD AUTO: 0.39 10*3/MM3 (ref 0.1–0.9)
MONOCYTES NFR BLD AUTO: 7.2 % (ref 5–12)
NEUTROPHILS NFR BLD AUTO: 2.7 10*3/MM3 (ref 1.7–7)
NEUTROPHILS NFR BLD AUTO: 49.8 % (ref 42.7–76)
NRBC BLD AUTO-RTO: 0 /100 WBC (ref 0–0.2)
PLATELET # BLD AUTO: 237 10*3/MM3 (ref 140–450)
PMV BLD AUTO: 9.5 FL (ref 6–12)
POTASSIUM SERPL-SCNC: 4.5 MMOL/L (ref 3.5–5.2)
PROT SERPL-MCNC: 7.2 G/DL (ref 6–8.5)
RBC # BLD AUTO: 5.25 10*6/MM3 (ref 4.14–5.8)
SODIUM SERPL-SCNC: 140 MMOL/L (ref 136–145)
TRIGL SERPL-MCNC: 76 MG/DL (ref 0–150)
TSH SERPL DL<=0.05 MIU/L-ACNC: 0.56 UIU/ML (ref 0.27–4.2)
VLDLC SERPL-MCNC: 15 MG/DL (ref 5–40)
WBC NRBC COR # BLD: 5.41 10*3/MM3 (ref 3.4–10.8)

## 2023-02-20 PROCEDURE — 36415 COLL VENOUS BLD VENIPUNCTURE: CPT

## 2023-02-20 PROCEDURE — 80061 LIPID PANEL: CPT

## 2023-02-20 PROCEDURE — 83036 HEMOGLOBIN GLYCOSYLATED A1C: CPT

## 2023-02-20 PROCEDURE — 82043 UR ALBUMIN QUANTITATIVE: CPT

## 2023-02-20 PROCEDURE — 80050 GENERAL HEALTH PANEL: CPT

## 2023-02-28 ENCOUNTER — OFFICE VISIT (OUTPATIENT)
Dept: FAMILY MEDICINE CLINIC | Facility: CLINIC | Age: 33
End: 2023-02-28
Payer: COMMERCIAL

## 2023-02-28 VITALS
HEART RATE: 83 BPM | BODY MASS INDEX: 37.35 KG/M2 | TEMPERATURE: 97.8 F | HEIGHT: 74 IN | DIASTOLIC BLOOD PRESSURE: 59 MMHG | SYSTOLIC BLOOD PRESSURE: 104 MMHG | OXYGEN SATURATION: 98 % | WEIGHT: 291 LBS

## 2023-02-28 DIAGNOSIS — K21.9 GASTROESOPHAGEAL REFLUX DISEASE WITHOUT ESOPHAGITIS: ICD-10-CM

## 2023-02-28 DIAGNOSIS — R61 HYPERHIDROSIS: ICD-10-CM

## 2023-02-28 DIAGNOSIS — E11.9 TYPE 2 DIABETES MELLITUS WITHOUT COMPLICATION, WITHOUT LONG-TERM CURRENT USE OF INSULIN: Primary | ICD-10-CM

## 2023-02-28 PROCEDURE — 99214 OFFICE O/P EST MOD 30 MIN: CPT | Performed by: NURSE PRACTITIONER

## 2023-02-28 RX ORDER — METFORMIN HYDROCHLORIDE 500 MG/1
500 TABLET, EXTENDED RELEASE ORAL 2 TIMES DAILY
Qty: 60 TABLET | Refills: 5 | Status: SHIPPED | OUTPATIENT
Start: 2023-02-28

## 2023-02-28 RX ORDER — LISINOPRIL 10 MG/1
10 TABLET ORAL DAILY
Qty: 30 TABLET | Refills: 5 | Status: SHIPPED | OUTPATIENT
Start: 2023-02-28

## 2023-02-28 RX ORDER — OMEPRAZOLE 20 MG/1
20 CAPSULE, DELAYED RELEASE ORAL DAILY
Qty: 30 CAPSULE | Refills: 5 | Status: SHIPPED | OUTPATIENT
Start: 2023-02-28

## 2023-02-28 NOTE — PROGRESS NOTES
Chief Complaint  Follow-up (6 months), Diabetes, Heartburn, and Excessive Sweating    SUBJECTIVE  Abdiel Rico Terrell presents to [facility]     Diabetes Mellitus, type 2: Patient is taking Metformin, Ozempic. Patient is compliant with medications, but was unable to get Ozempic from early December until last week due to national backorder.  Patient's last A1c is 5.8% on 2/20/23. Patient does not monitor blood sugar at home.  Patient denies extreme high and low blood sugars.  Patient's last DM eye exam was 11/2/22 per [facility].  Patient does not get DM foot exams.  Patient denies any unhealing sores. Patient attempts to monitor carbohydrate/ sugar intake in diet.     Kidney Health:  Patient is taking Lisinopril.    Gastroesophageal Reflux:  Patient is taking Omeprazole, with good control of symptoms.  Patient does rarely need over the counter medications for breakthrough symptoms.  Patient tries to avoid trigger foods, eat frequent small meals, not lie down within 2 hours of eating, avoids NSAIDS medications and alcohol.    Hyperhidrosis:  Patient is using Drysol with good control of symptoms.        History of Present Illness  Past Medical History:   Diagnosis Date   • Anxiety    • Diabetes mellitus (HCC)     type II, average  or below   • GERD (gastroesophageal reflux disease)    • Lower back pain       Family History   Problem Relation Age of Onset   • Cancer Mother    • Cancer Father    • Cancer Son    • Colon cancer Neg Hx    • Malig Hyperthermia Neg Hx       Past Surgical History:   Procedure Laterality Date   • APPENDECTOMY     • COLONOSCOPY     • ENDOSCOPY N/A 04/18/2022    Procedure: ESOPHAGOGASTRODUODENOSCOPY WITH BIOPSIES;  Surgeon: Nadeem Kirk MD;  Location: [facility] ENDOSCOPY;  Service: Gastroenterology;  Laterality: N/A;  NORMAL EGD   • FEMUR SURGERY Right     x2   • LUMBAR LAMINECTOMY Left 10/21/2022    Procedure: MINIMALLY INVASIVE LUMBAR LAMINECTOMY AND  "DISCECTOMY, LEFT APPROACH LUMBAR 4-LUMBAR 5;  Surgeon: Rex Reis MD;  Location: [facility] MAIN OR;  Service: Neurosurgery;  Laterality: Left;   • NASAL SEPTUM SURGERY     • TONSILLECTOMY     • UPPER GASTROINTESTINAL ENDOSCOPY     • VASECTOMY      3/11/22        Current Outpatient Medications:   •  aluminum chloride (Drysol) 20 % external solution, Apply 1 each topically to the appropriate area as directed Every Night., Disp: 35 mL, Rfl: 1  •  levocetirizine (XYZAL) 5 MG tablet, Take 1 tablet by mouth Daily., Disp: , Rfl:   •  lisinopril (PRINIVIL,ZESTRIL) 10 MG tablet, Take 1 tablet by mouth Daily., Disp: 30 tablet, Rfl: 5  •  metFORMIN ER (GLUCOPHAGE-XR) 500 MG 24 hr tablet, Take 1 tablet by mouth 2 (Two) Times a Day., Disp: 60 tablet, Rfl: 5  •  omeprazole (priLOSEC) 20 MG capsule, Take 1 capsule by mouth Daily., Disp: 30 capsule, Rfl: 5  •  Semaglutide, 1 MG/DOSE, (OZEMPIC) 2 MG/1.5ML solution pen-injector, Inject 0.5 mg under the skin into the appropriate area as directed 1 (One) Time Per Week., Disp: 1.5 mL, Rfl: 2    OBJECTIVE  Vital Signs:   /59 (BP Location: Left arm, Patient Position: Sitting, Cuff Size: Large Adult)   Pulse 83   Temp 97.8 °F (36.6 °C) (Oral)   Ht 188 cm (74\")   Wt 132 kg (291 lb)   SpO2 98%   BMI 37.36 kg/m²    Estimated body mass index is 37.36 kg/m² as calculated from the following:    Height as of this encounter: 188 cm (74\").    Weight as of this encounter: 132 kg (291 lb).     Wt Readings from Last 3 Encounters:   02/28/23 132 kg (291 lb)   12/06/22 127 kg (280 lb)   11/10/22 127 kg (280 lb)     BP Readings from Last 3 Encounters:   02/28/23 104/59   12/06/22 127/77   11/10/22 106/68       Physical Exam  Vitals reviewed.   Constitutional:       Appearance: Normal appearance. He is well-developed.   HENT:      Head: Normocephalic and atraumatic.      Right Ear: External ear normal.      Left Ear: External ear normal.      Mouth/Throat:      Pharynx: No oropharyngeal " exudate.   Eyes:      Conjunctiva/sclera: Conjunctivae normal.      Pupils: Pupils are equal, round, and reactive to light.   Neck:      Vascular: No carotid bruit.   Cardiovascular:      Rate and Rhythm: Normal rate and regular rhythm.      Pulses: Normal pulses.      Heart sounds: Normal heart sounds. No murmur heard.    No friction rub. No gallop.   Pulmonary:      Effort: Pulmonary effort is normal.      Breath sounds: Normal breath sounds. No wheezing or rhonchi.   Skin:     General: Skin is warm and dry.   Neurological:      Mental Status: He is alert and oriented to person, place, and time.      Cranial Nerves: No cranial nerve deficit.   Psychiatric:         Mood and Affect: Mood and affect normal.         Behavior: Behavior normal.         Thought Content: Thought content normal.         Judgment: Judgment normal.          Result Review    CMP    CMP 7/26/22 2/20/23   Glucose 102 (A) 102 (A)   BUN 12 14   Creatinine 1.21 1.03   eGFR 81.1 98.4   Sodium 142 140   Potassium 4.6 4.5   Chloride 105 105   Calcium 9.8 10.0   Total Protein 7.3 7.2   Albumin 4.40 4.3   Globulin 2.9 2.9   Total Bilirubin 0.3 0.4   Alkaline Phosphatase 20 (A) 16 (A)   AST (SGOT) 20 19   ALT (SGPT) 26 46 (A)   Albumin/Globulin Ratio 1.5 1.5   BUN/Creatinine Ratio 9.9 13.6   Anion Gap 12.0 10.6   (A) Abnormal value       Comments are available for some flowsheets but are not being displayed.           CBC    CBC 7/26/22 2/20/23   WBC 8.38 5.41   RBC 5.15 5.25   Hemoglobin 14.0 13.7   Hematocrit 42.5 41.3   MCV 82.5 78.7 (A)   MCH 27.2 26.1 (A)   MCHC 32.9 33.2   RDW 13.8 13.9   Platelets 281 237   (A) Abnormal value            Lipid Panel    Lipid Panel 7/26/22 2/20/23   Total Cholesterol 167 127   Triglycerides 90 76   HDL Cholesterol 42 34 (A)   VLDL Cholesterol 17 15   LDL Cholesterol  108 (A) 78   LDL/HDL Ratio 2.55 2.29   (A) Abnormal value            TSH    TSH 2/20/23   TSH 0.561           Most Recent A1C    HGBA1C Most Recent  2/20/23   Hemoglobin A1C 5.80 (A)   (A) Abnormal value              No Images in the past 120 days found..      The above data has been reviewed by PERRI Reynaga 02/28/2023 08:20 EST.          Patient Care Team:  Atiya Hodge APRN as PCP - General (Family Medicine)  Nadeem Kirk MD as Consulting Physician (Gastroenterology)    Class 2 Severe Obesity (BMI >=35 and <=39.9). Obesity-related health conditions include the following: hypertension, dyslipidemias and GERD. Obesity is improving with treatment. BMI is is above average; BMI management plan is completed. We discussed low calorie, low carb based diet program, portion control and increasing exercise.       ASSESSMENT & PLAN    Diagnoses and all orders for this visit:    1. Type 2 diabetes mellitus without complication, without long-term current use of insulin (HCC) (Primary)  Comments:  Symptoms well controlled with current medication regimen, cont. Current meds.  Orders:  -     lisinopril (PRINIVIL,ZESTRIL) 10 MG tablet; Take 1 tablet by mouth Daily.  Dispense: 30 tablet; Refill: 5  -     metFORMIN ER (GLUCOPHAGE-XR) 500 MG 24 hr tablet; Take 1 tablet by mouth 2 (Two) Times a Day.  Dispense: 60 tablet; Refill: 5    2. Hyperhidrosis  Comments:  Well-controlled with Drysol, continue medication.    3. Gastroesophageal reflux disease without esophagitis  Comments:  Symptoms well controlled with current medication regimen, cont. Current meds.  Orders:  -     omeprazole (priLOSEC) 20 MG capsule; Take 1 capsule by mouth Daily.  Dispense: 30 capsule; Refill: 5         Tobacco Use: High Risk   • Smoking Tobacco Use: Former   • Smokeless Tobacco Use: Current   • Passive Exposure: Not on file       Follow Up     Return in about 6 months (around 8/28/2023).      Patient was given instructions and counseling regarding his condition or for health maintenance advice. Please see specific information pulled into the AVS if appropriate.   I have reviewed  information obtained and documented by others and I have confirmed the accuracy of this documented note.    Atiya Hodge APRN

## 2023-05-22 ENCOUNTER — TELEPHONE (OUTPATIENT)
Dept: FAMILY MEDICINE CLINIC | Facility: CLINIC | Age: 33
End: 2023-05-22
Payer: COMMERCIAL

## 2023-05-22 RX ORDER — SEMAGLUTIDE 0.68 MG/ML
0.5 INJECTION, SOLUTION SUBCUTANEOUS WEEKLY
Qty: 3 ML | Refills: 5 | Status: SHIPPED | OUTPATIENT
Start: 2023-05-22

## 2023-05-22 NOTE — TELEPHONE ENCOUNTER
RECEIVED CALL FROM PHARMACIST LAN BOWMAN ([phone]) STATING NEEDS NEW RX FOR PT OZEMPIC AS PT IS TRYING TO GET REFILL BUT THE MANUFACTURE OF THE PEN NO LONGER MAKES 2MG/1.5ML PENS BUT NOW MAKES 2MG/3ML PENS.

## 2023-08-14 ENCOUNTER — LAB (OUTPATIENT)
Dept: LAB | Facility: HOSPITAL | Age: 33
End: 2023-08-14
Payer: COMMERCIAL

## 2023-08-14 ENCOUNTER — TRANSCRIBE ORDERS (OUTPATIENT)
Dept: LAB | Facility: HOSPITAL | Age: 33
End: 2023-08-14
Payer: COMMERCIAL

## 2023-08-14 DIAGNOSIS — I10 HYPERTENSION, UNSPECIFIED TYPE: ICD-10-CM

## 2023-08-14 DIAGNOSIS — E78.5 HYPERLIPIDEMIA, UNSPECIFIED HYPERLIPIDEMIA TYPE: ICD-10-CM

## 2023-08-14 DIAGNOSIS — E78.5 HYPERLIPIDEMIA, UNSPECIFIED HYPERLIPIDEMIA TYPE: Primary | ICD-10-CM

## 2023-08-14 LAB
ALBUMIN SERPL-MCNC: 4.7 G/DL (ref 3.5–5.2)
ALBUMIN UR-MCNC: 2.6 MG/DL
ALBUMIN/GLOB SERPL: 1.8 G/DL
ALP SERPL-CCNC: 21 U/L (ref 39–117)
ALT SERPL W P-5'-P-CCNC: 17 U/L (ref 1–41)
ANION GAP SERPL CALCULATED.3IONS-SCNC: 11 MMOL/L (ref 5–15)
AST SERPL-CCNC: 18 U/L (ref 1–40)
BASOPHILS # BLD AUTO: 0.04 10*3/MM3 (ref 0–0.2)
BASOPHILS NFR BLD AUTO: 0.5 % (ref 0–1.5)
BILIRUB SERPL-MCNC: 0.5 MG/DL (ref 0–1.2)
BUN SERPL-MCNC: 11 MG/DL (ref 6–20)
BUN/CREAT SERPL: 9.1 (ref 7–25)
CALCIUM SPEC-SCNC: 9.9 MG/DL (ref 8.6–10.5)
CHLORIDE SERPL-SCNC: 104 MMOL/L (ref 98–107)
CHOLEST SERPL-MCNC: 144 MG/DL (ref 0–200)
CO2 SERPL-SCNC: 24 MMOL/L (ref 22–29)
CREAT SERPL-MCNC: 1.21 MG/DL (ref 0.76–1.27)
DEPRECATED RDW RBC AUTO: 43.4 FL (ref 37–54)
EGFRCR SERPLBLD CKD-EPI 2021: 80.6 ML/MIN/1.73
EOSINOPHIL # BLD AUTO: 0.11 10*3/MM3 (ref 0–0.4)
EOSINOPHIL NFR BLD AUTO: 1.4 % (ref 0.3–6.2)
ERYTHROCYTE [DISTWIDTH] IN BLOOD BY AUTOMATED COUNT: 15.7 % (ref 12.3–15.4)
GLOBULIN UR ELPH-MCNC: 2.6 GM/DL
GLUCOSE SERPL-MCNC: 93 MG/DL (ref 65–99)
HBA1C MFR BLD: 5.6 % (ref 4.8–5.6)
HCT VFR BLD AUTO: 43.4 % (ref 37.5–51)
HDLC SERPL-MCNC: 39 MG/DL (ref 40–60)
HGB BLD-MCNC: 13.9 G/DL (ref 13–17.7)
IMM GRANULOCYTES # BLD AUTO: 0.03 10*3/MM3 (ref 0–0.05)
IMM GRANULOCYTES NFR BLD AUTO: 0.4 % (ref 0–0.5)
LDLC SERPL CALC-MCNC: 87 MG/DL (ref 0–100)
LDLC/HDLC SERPL: 2.22 {RATIO}
LYMPHOCYTES # BLD AUTO: 2.6 10*3/MM3 (ref 0.7–3.1)
LYMPHOCYTES NFR BLD AUTO: 32.7 % (ref 19.6–45.3)
MCH RBC QN AUTO: 24.9 PG (ref 26.6–33)
MCHC RBC AUTO-ENTMCNC: 32 G/DL (ref 31.5–35.7)
MCV RBC AUTO: 77.8 FL (ref 79–97)
MONOCYTES # BLD AUTO: 0.66 10*3/MM3 (ref 0.1–0.9)
MONOCYTES NFR BLD AUTO: 8.3 % (ref 5–12)
NEUTROPHILS NFR BLD AUTO: 4.51 10*3/MM3 (ref 1.7–7)
NEUTROPHILS NFR BLD AUTO: 56.7 % (ref 42.7–76)
NRBC BLD AUTO-RTO: 0 /100 WBC (ref 0–0.2)
PLATELET # BLD AUTO: 259 10*3/MM3 (ref 140–450)
PMV BLD AUTO: 9.4 FL (ref 6–12)
POTASSIUM SERPL-SCNC: 4.4 MMOL/L (ref 3.5–5.2)
PROT SERPL-MCNC: 7.3 G/DL (ref 6–8.5)
RBC # BLD AUTO: 5.58 10*6/MM3 (ref 4.14–5.8)
SODIUM SERPL-SCNC: 139 MMOL/L (ref 136–145)
TRIGL SERPL-MCNC: 93 MG/DL (ref 0–150)
TSH SERPL DL<=0.05 MIU/L-ACNC: 0.65 UIU/ML (ref 0.27–4.2)
VLDLC SERPL-MCNC: 18 MG/DL (ref 5–40)
WBC NRBC COR # BLD: 7.95 10*3/MM3 (ref 3.4–10.8)

## 2023-08-14 PROCEDURE — 83036 HEMOGLOBIN GLYCOSYLATED A1C: CPT

## 2023-08-14 PROCEDURE — 80050 GENERAL HEALTH PANEL: CPT

## 2023-08-14 PROCEDURE — 82043 UR ALBUMIN QUANTITATIVE: CPT

## 2023-08-14 PROCEDURE — 36415 COLL VENOUS BLD VENIPUNCTURE: CPT

## 2023-08-14 PROCEDURE — 80061 LIPID PANEL: CPT

## 2023-08-29 ENCOUNTER — OFFICE VISIT (OUTPATIENT)
Dept: FAMILY MEDICINE CLINIC | Facility: CLINIC | Age: 33
End: 2023-08-29
Payer: COMMERCIAL

## 2023-08-29 VITALS
HEART RATE: 80 BPM | WEIGHT: 284.6 LBS | HEIGHT: 74 IN | SYSTOLIC BLOOD PRESSURE: 115 MMHG | BODY MASS INDEX: 36.52 KG/M2 | DIASTOLIC BLOOD PRESSURE: 72 MMHG | OXYGEN SATURATION: 98 %

## 2023-08-29 DIAGNOSIS — R61 HYPERHIDROSIS: ICD-10-CM

## 2023-08-29 DIAGNOSIS — Z23 NEED FOR HEPATITIS B VACCINATION: ICD-10-CM

## 2023-08-29 DIAGNOSIS — K21.9 GASTROESOPHAGEAL REFLUX DISEASE WITHOUT ESOPHAGITIS: ICD-10-CM

## 2023-08-29 DIAGNOSIS — E11.9 TYPE 2 DIABETES MELLITUS WITHOUT COMPLICATION, WITHOUT LONG-TERM CURRENT USE OF INSULIN: Primary | ICD-10-CM

## 2023-08-29 PROCEDURE — 99214 OFFICE O/P EST MOD 30 MIN: CPT | Performed by: NURSE PRACTITIONER

## 2023-08-29 PROCEDURE — 90471 IMMUNIZATION ADMIN: CPT | Performed by: NURSE PRACTITIONER

## 2023-08-29 PROCEDURE — 90746 HEPB VACCINE 3 DOSE ADULT IM: CPT | Performed by: NURSE PRACTITIONER

## 2023-08-29 RX ORDER — LISINOPRIL 10 MG/1
10 TABLET ORAL DAILY
Qty: 30 TABLET | Refills: 5 | Status: SHIPPED | OUTPATIENT
Start: 2023-08-29

## 2023-08-29 RX ORDER — OMEPRAZOLE 20 MG/1
20 CAPSULE, DELAYED RELEASE ORAL DAILY
Qty: 30 CAPSULE | Refills: 5 | Status: SHIPPED | OUTPATIENT
Start: 2023-08-29

## 2023-08-29 RX ORDER — METFORMIN HYDROCHLORIDE 500 MG/1
500 TABLET, EXTENDED RELEASE ORAL
Qty: 30 TABLET | Refills: 5 | Status: SHIPPED | OUTPATIENT
Start: 2023-08-29

## 2023-08-29 RX ORDER — METFORMIN HYDROCHLORIDE 500 MG/1
500 TABLET, EXTENDED RELEASE ORAL 2 TIMES DAILY
Qty: 60 TABLET | Refills: 5 | Status: CANCELLED | OUTPATIENT
Start: 2023-08-29

## 2023-08-29 RX ORDER — ALUMINUM CHLORIDE 20 %
1 SOLUTION, NON-ORAL TOPICAL NIGHTLY
Qty: 35 ML | Refills: 1 | Status: SHIPPED | OUTPATIENT
Start: 2023-08-29

## 2023-08-29 NOTE — PROGRESS NOTES
Chief Complaint  Follow-up, Heartburn, and Diabetes    SUBJECTIVE  Abdiel Terrell presents to [facility]     History of Present Illness  Pt is here to f/u w/ diabetes, weight loss and gerd. Pt states he's doing well with no issues or concerns. Pt would like to discuss weight loss med as insurance will not cover it.     Pt is due for vaccines, pt would like Hep b vaccine today.     Pt due for phyiscal       Past Medical History:   Diagnosis Date    Anxiety     Diabetes mellitus     type II, average  or below    GERD (gastroesophageal reflux disease)     Lower back pain       Family History   Problem Relation Age of Onset    Cancer Mother     Cancer Father     Cancer Son     Colon cancer Neg Hx     Malig Hyperthermia Neg Hx       Past Surgical History:   Procedure Laterality Date    APPENDECTOMY      COLONOSCOPY      ENDOSCOPY N/A 04/18/2022    Procedure: ESOPHAGOGASTRODUODENOSCOPY WITH BIOPSIES;  Surgeon: Nadeem Kirk MD;  Location: [facility] ENDOSCOPY;  Service: Gastroenterology;  Laterality: N/A;  NORMAL EGD    FEMUR SURGERY Right     x2    LUMBAR LAMINECTOMY Left 10/21/2022    Procedure: MINIMALLY INVASIVE LUMBAR LAMINECTOMY AND DISCECTOMY, LEFT APPROACH LUMBAR 4-LUMBAR 5;  Surgeon: Rex Reis MD;  Location: [facility] MAIN OR;  Service: Neurosurgery;  Laterality: Left;    NASAL SEPTUM SURGERY      TONSILLECTOMY      UPPER GASTROINTESTINAL ENDOSCOPY      VASECTOMY      3/11/22        Current Outpatient Medications:     aluminum chloride (Drysol) 20 % external solution, Apply 1 each topically to the appropriate area as directed Every Night., Disp: 35 mL, Rfl: 1    levocetirizine (XYZAL) 5 MG tablet, Take 1 tablet by mouth Daily., Disp: , Rfl:     lisinopril (PRINIVIL,ZESTRIL) 10 MG tablet, Take 1 tablet by mouth Daily., Disp: 30 tablet, Rfl: 5    metFORMIN ER (GLUCOPHAGE-XR) 500 MG 24 hr tablet, Take 1 tablet by mouth Daily With Breakfast., Disp: 30 tablet, Rfl: 5     "omeprazole (priLOSEC) 20 MG capsule, Take 1 capsule by mouth Daily., Disp: 30 capsule, Rfl: 5    OBJECTIVE  Vital Signs:   /72   Pulse 80   Ht 188 cm (74\")   Wt 129 kg (284 lb 9.6 oz)   SpO2 98%   BMI 36.54 kg/mý    Estimated body mass index is 36.54 kg/mý as calculated from the following:    Height as of this encounter: 188 cm (74\").    Weight as of this encounter: 129 kg (284 lb 9.6 oz).     Wt Readings from Last 3 Encounters:   08/29/23 129 kg (284 lb 9.6 oz)   07/15/23 131 kg (289 lb 6.4 oz)   02/28/23 132 kg (291 lb)     BP Readings from Last 3 Encounters:   08/29/23 115/72   07/15/23 121/78   02/28/23 104/59       Physical Exam  Vitals reviewed.   Constitutional:       Appearance: Normal appearance. He is well-developed.   HENT:      Head: Normocephalic and atraumatic.      Right Ear: External ear normal.      Left Ear: External ear normal.      Mouth/Throat:      Pharynx: No oropharyngeal exudate.   Eyes:      Conjunctiva/sclera: Conjunctivae normal.      Pupils: Pupils are equal, round, and reactive to light.   Neck:      Vascular: No carotid bruit.   Cardiovascular:      Rate and Rhythm: Normal rate and regular rhythm.      Pulses: Normal pulses.      Heart sounds: Normal heart sounds. No murmur heard.    No friction rub. No gallop.   Pulmonary:      Effort: Pulmonary effort is normal.      Breath sounds: Normal breath sounds. No wheezing or rhonchi.   Skin:     General: Skin is warm and dry.   Neurological:      Mental Status: He is alert and oriented to person, place, and time.      Cranial Nerves: No cranial nerve deficit.   Psychiatric:         Mood and Affect: Mood and affect normal.         Behavior: Behavior normal.         Thought Content: Thought content normal.         Judgment: Judgment normal.        Result Review    CMP          2/20/2023    10:10 8/14/2023    10:27   CMP   Glucose 102  93    BUN 14  11    Creatinine 1.03  1.21    EGFR 98.4  80.6    Sodium 140  139    Potassium 4.5  " 4.4    Chloride 105  104    Calcium 10.0  9.9    Total Protein 7.2  7.3    Albumin 4.3  4.7    Globulin 2.9  2.6    Total Bilirubin 0.4  0.5    Alkaline Phosphatase 16  21    AST (SGOT) 19  18    ALT (SGPT) 46  17    Albumin/Globulin Ratio 1.5  1.8    BUN/Creatinine Ratio 13.6  9.1    Anion Gap 10.6  11.0      CBC          2/20/2023    10:10 8/14/2023    10:27   CBC   WBC 5.41  7.95    RBC 5.25  5.58    Hemoglobin 13.7  13.9    Hematocrit 41.3  43.4    MCV 78.7  77.8    MCH 26.1  24.9    MCHC 33.2  32.0    RDW 13.9  15.7    Platelets 237  259      Lipid Panel          2/20/2023    10:10 8/14/2023    10:27   Lipid Panel   Total Cholesterol 127  144    Triglycerides 76  93    HDL Cholesterol 34  39    VLDL Cholesterol 15  18    LDL Cholesterol  78  87    LDL/HDL Ratio 2.29  2.22      TSH          2/20/2023    10:10 8/14/2023    10:27   TSH   TSH 0.561  0.648        No Images in the past 120 days found..     The above data has been reviewed by PERRI Reynaga 08/29/2023 07:11 EDT.          Patient Care Team:  Atiya Hodge APRN as PCP - General (Family Medicine)  Nadeem Kirk MD as Consulting Physician (Gastroenterology)            ASSESSMENT & PLAN    Diagnoses and all orders for this visit:    1. Type 2 diabetes mellitus without complication, without long-term current use of insulin (Primary)  Comments:  Symptoms well controlled with current medication regimen, cont. Current meds.  Orders:  -     lisinopril (PRINIVIL,ZESTRIL) 10 MG tablet; Take 1 tablet by mouth Daily.  Dispense: 30 tablet; Refill: 5  -     metFORMIN ER (GLUCOPHAGE-XR) 500 MG 24 hr tablet; Take 1 tablet by mouth Daily With Breakfast.  Dispense: 30 tablet; Refill: 5    2. Hyperhidrosis  -     aluminum chloride (Drysol) 20 % external solution; Apply 1 each topically to the appropriate area as directed Every Night.  Dispense: 35 mL; Refill: 1    3. Gastroesophageal reflux disease without esophagitis  Comments:  Symptoms well controlled  "with current medication regimen, cont. Current meds.  Orders:  -     omeprazole (priLOSEC) 20 MG capsule; Take 1 capsule by mouth Daily.  Dispense: 30 capsule; Refill: 5    4. Need for hepatitis B vaccination  -     Hepatitis B Vaccine Adult IM (ENERGIX/RECOMBIVAX)       "Discussed risks/benefits to vaccination, reviewed components of the vaccine, discussed VIS, discussed informed consent, informed consent obtained. Patient/Parent was allowed to accept or refuse vaccine. Questions answered to satisfactory state of patient/Parent. We reviewed typical age appropriate and seasonally appropriate vaccinations. Reviewed immunization history and updated state vaccination form as needed. Patient was counseled on Hep B    Tobacco Use: High Risk    Smoking Tobacco Use: Former    Smokeless Tobacco Use: Current    Passive Exposure: Not on file       Follow Up     Return in about 6 months (around 2/29/2024).        Patient was given instructions and counseling regarding his condition or for health maintenance advice. Please see specific information pulled into the AVS if appropriate.   I have reviewed information obtained and documented by others and I have confirmed the accuracy of this documented note.    PERRI Reynaga        Answers submitted by the patient for this visit:  Primary Reason for Visit (Submitted on 8/22/2023)  What is the primary reason for your visit?: Physical    "

## 2024-01-08 ENCOUNTER — LAB (OUTPATIENT)
Dept: LAB | Facility: HOSPITAL | Age: 34
End: 2024-01-08
Payer: COMMERCIAL

## 2024-01-08 ENCOUNTER — OFFICE VISIT (OUTPATIENT)
Dept: FAMILY MEDICINE CLINIC | Facility: CLINIC | Age: 34
End: 2024-01-08
Payer: COMMERCIAL

## 2024-01-08 VITALS
BODY MASS INDEX: 39.04 KG/M2 | HEART RATE: 94 BPM | WEIGHT: 304.2 LBS | OXYGEN SATURATION: 98 % | HEIGHT: 74 IN | DIASTOLIC BLOOD PRESSURE: 83 MMHG | SYSTOLIC BLOOD PRESSURE: 132 MMHG

## 2024-01-08 DIAGNOSIS — K21.9 GASTROESOPHAGEAL REFLUX DISEASE WITHOUT ESOPHAGITIS: ICD-10-CM

## 2024-01-08 DIAGNOSIS — Z00.00 ANNUAL PHYSICAL EXAM: Primary | ICD-10-CM

## 2024-01-08 DIAGNOSIS — J30.1 ALLERGIC RHINITIS DUE TO POLLEN, UNSPECIFIED SEASONALITY: ICD-10-CM

## 2024-01-08 DIAGNOSIS — R63.5 WEIGHT GAIN: ICD-10-CM

## 2024-01-08 DIAGNOSIS — Z00.00 ANNUAL PHYSICAL EXAM: ICD-10-CM

## 2024-01-08 DIAGNOSIS — I10 HYPERTENSION, UNSPECIFIED TYPE: ICD-10-CM

## 2024-01-08 DIAGNOSIS — Z23 FLU VACCINE NEED: ICD-10-CM

## 2024-01-08 DIAGNOSIS — R61 HYPERHIDROSIS: ICD-10-CM

## 2024-01-08 LAB
ALBUMIN SERPL-MCNC: 4.7 G/DL (ref 3.5–5.2)
ALBUMIN/GLOB SERPL: 1.6 G/DL
ALP SERPL-CCNC: 22 U/L (ref 39–117)
ALT SERPL W P-5'-P-CCNC: 28 U/L (ref 1–41)
ANION GAP SERPL CALCULATED.3IONS-SCNC: 11 MMOL/L (ref 5–15)
AST SERPL-CCNC: 29 U/L (ref 1–40)
BASOPHILS # BLD AUTO: 0.07 10*3/MM3 (ref 0–0.2)
BASOPHILS NFR BLD AUTO: 0.9 % (ref 0–1.5)
BILIRUB SERPL-MCNC: 0.4 MG/DL (ref 0–1.2)
BUN SERPL-MCNC: 13 MG/DL (ref 6–20)
BUN/CREAT SERPL: 10.2 (ref 7–25)
CALCIUM SPEC-SCNC: 10.2 MG/DL (ref 8.6–10.5)
CHLORIDE SERPL-SCNC: 101 MMOL/L (ref 98–107)
CHOLEST SERPL-MCNC: 193 MG/DL (ref 0–200)
CO2 SERPL-SCNC: 25 MMOL/L (ref 22–29)
CREAT SERPL-MCNC: 1.28 MG/DL (ref 0.76–1.27)
DEPRECATED RDW RBC AUTO: 40.2 FL (ref 37–54)
EGFRCR SERPLBLD CKD-EPI 2021: 75.3 ML/MIN/1.73
EOSINOPHIL # BLD AUTO: 0.09 10*3/MM3 (ref 0–0.4)
EOSINOPHIL NFR BLD AUTO: 1.2 % (ref 0.3–6.2)
ERYTHROCYTE [DISTWIDTH] IN BLOOD BY AUTOMATED COUNT: 15 % (ref 12.3–15.4)
GLOBULIN UR ELPH-MCNC: 3 GM/DL
GLUCOSE SERPL-MCNC: 103 MG/DL (ref 65–99)
HCT VFR BLD AUTO: 45.5 % (ref 37.5–51)
HDLC SERPL-MCNC: 50 MG/DL (ref 40–60)
HGB BLD-MCNC: 14.1 G/DL (ref 13–17.7)
IMM GRANULOCYTES # BLD AUTO: 0.04 10*3/MM3 (ref 0–0.05)
IMM GRANULOCYTES NFR BLD AUTO: 0.5 % (ref 0–0.5)
LDLC SERPL CALC-MCNC: 130 MG/DL (ref 0–100)
LDLC/HDLC SERPL: 2.59 {RATIO}
LYMPHOCYTES # BLD AUTO: 2.31 10*3/MM3 (ref 0.7–3.1)
LYMPHOCYTES NFR BLD AUTO: 29.8 % (ref 19.6–45.3)
MCH RBC QN AUTO: 23.5 PG (ref 26.6–33)
MCHC RBC AUTO-ENTMCNC: 31 G/DL (ref 31.5–35.7)
MCV RBC AUTO: 75.7 FL (ref 79–97)
MONOCYTES # BLD AUTO: 0.59 10*3/MM3 (ref 0.1–0.9)
MONOCYTES NFR BLD AUTO: 7.6 % (ref 5–12)
NEUTROPHILS NFR BLD AUTO: 4.64 10*3/MM3 (ref 1.7–7)
NEUTROPHILS NFR BLD AUTO: 60 % (ref 42.7–76)
NRBC BLD AUTO-RTO: 0 /100 WBC (ref 0–0.2)
PLATELET # BLD AUTO: 334 10*3/MM3 (ref 140–450)
PMV BLD AUTO: 9.5 FL (ref 6–12)
POTASSIUM SERPL-SCNC: 5 MMOL/L (ref 3.5–5.2)
PROT SERPL-MCNC: 7.7 G/DL (ref 6–8.5)
RBC # BLD AUTO: 6.01 10*6/MM3 (ref 4.14–5.8)
SODIUM SERPL-SCNC: 137 MMOL/L (ref 136–145)
TRIGL SERPL-MCNC: 68 MG/DL (ref 0–150)
TSH SERPL DL<=0.05 MIU/L-ACNC: 0.53 UIU/ML (ref 0.27–4.2)
VLDLC SERPL-MCNC: 13 MG/DL (ref 5–40)
WBC NRBC COR # BLD AUTO: 7.74 10*3/MM3 (ref 3.4–10.8)

## 2024-01-08 PROCEDURE — 99395 PREV VISIT EST AGE 18-39: CPT | Performed by: NURSE PRACTITIONER

## 2024-01-08 PROCEDURE — 36415 COLL VENOUS BLD VENIPUNCTURE: CPT

## 2024-01-08 PROCEDURE — 80050 GENERAL HEALTH PANEL: CPT

## 2024-01-08 PROCEDURE — 99213 OFFICE O/P EST LOW 20 MIN: CPT | Performed by: NURSE PRACTITIONER

## 2024-01-08 PROCEDURE — 90471 IMMUNIZATION ADMIN: CPT | Performed by: NURSE PRACTITIONER

## 2024-01-08 PROCEDURE — 80061 LIPID PANEL: CPT

## 2024-01-08 PROCEDURE — 90686 IIV4 VACC NO PRSV 0.5 ML IM: CPT | Performed by: NURSE PRACTITIONER

## 2024-01-08 RX ORDER — LEVOCETIRIZINE DIHYDROCHLORIDE 5 MG/1
5 TABLET, FILM COATED ORAL DAILY
Qty: 90 TABLET | Refills: 1 | Status: SHIPPED | OUTPATIENT
Start: 2024-01-08

## 2024-01-08 RX ORDER — OMEPRAZOLE 20 MG/1
20 CAPSULE, DELAYED RELEASE ORAL DAILY
Qty: 90 CAPSULE | Refills: 1 | Status: SHIPPED | OUTPATIENT
Start: 2024-01-08

## 2024-01-08 RX ORDER — ALUMINUM CHLORIDE 20 %
1 SOLUTION, NON-ORAL TOPICAL NIGHTLY
Qty: 35 ML | Refills: 1 | Status: SHIPPED | OUTPATIENT
Start: 2024-01-08

## 2024-01-08 NOTE — ASSESSMENT & PLAN NOTE
Medication options discussed with patient.  Will contact insurance to see if it covers weight loss medications, will consider restarting injectable diabetic medications if has better insurance coverage.

## 2024-01-08 NOTE — PROGRESS NOTES
Chief Complaint    Annual physical exam with lab    Weight Gain (Pt has some concerns over weight gain and c/o of some pain on R leg where he had surgery on his femor. //Pt due for vaccines and aware at this time. )    SUBJECTIVE  Abdiel Terrell presents to [facility]     Annual physical exam with lab    Pt c/o weight gain, worse since stopping ozempic. This was due to insurance not covering medication anymore. Pt has been taking metformin once daily.  He does not monitor his blood sugars at home.  He is doing well on all current medications and requesting refills today.  History of Present Illness  Past Medical History:   Diagnosis Date    Anxiety     Diabetes mellitus     type II, average  or below    GERD (gastroesophageal reflux disease)     Lower back pain       Family History   Problem Relation Age of Onset    Cancer Mother     Cancer Father     Cancer Son     Colon cancer Neg Hx     Malig Hyperthermia Neg Hx       Past Surgical History:   Procedure Laterality Date    APPENDECTOMY      COLONOSCOPY      ENDOSCOPY N/A 04/18/2022    Procedure: ESOPHAGOGASTRODUODENOSCOPY WITH BIOPSIES;  Surgeon: Nadeem Kirk MD;  Location: [facility] ENDOSCOPY;  Service: Gastroenterology;  Laterality: N/A;  NORMAL EGD    FEMUR SURGERY Right     x2    LUMBAR LAMINECTOMY Left 10/21/2022    Procedure: MINIMALLY INVASIVE LUMBAR LAMINECTOMY AND DISCECTOMY, LEFT APPROACH LUMBAR 4-LUMBAR 5;  Surgeon: Rex Reis MD;  Location: [facility] MAIN OR;  Service: Neurosurgery;  Laterality: Left;    NASAL SEPTUM SURGERY      TONSILLECTOMY      UPPER GASTROINTESTINAL ENDOSCOPY      VASECTOMY      3/11/22        Current Outpatient Medications:     aluminum chloride (Drysol) 20 % external solution, Apply 1 each topically to the appropriate area as directed Every Night., Disp: 35 mL, Rfl: 1    levocetirizine (XYZAL) 5 MG tablet, Take 1 tablet by mouth Daily., Disp: 90 tablet, Rfl: 1    lisinopril  "(PRINIVIL,ZESTRIL) 10 MG tablet, Daily., Disp: , Rfl:     metFORMIN ER (GLUCOPHAGE-XR) 500 MG 24 hr tablet, Take 1 tablet by mouth Daily With Breakfast., Disp: 30 tablet, Rfl: 5    omeprazole (priLOSEC) 20 MG capsule, Take 1 capsule by mouth Daily., Disp: 90 capsule, Rfl: 1    OBJECTIVE  Vital Signs:   /83   Pulse 94   Ht 188 cm (74\")   Wt (!) 138 kg (304 lb 3.2 oz)   SpO2 98%   BMI 39.06 kg/m²    Estimated body mass index is 39.06 kg/m² as calculated from the following:    Height as of this encounter: 188 cm (74\").    Weight as of this encounter: 138 kg (304 lb 3.2 oz).     Wt Readings from Last 3 Encounters:   01/08/24 (!) 138 kg (304 lb 3.2 oz)   10/22/23 130 kg (287 lb 1.6 oz)   08/29/23 129 kg (284 lb 9.6 oz)     BP Readings from Last 3 Encounters:   01/08/24 132/83   10/22/23 125/59   08/29/23 115/72       Physical Exam  Vitals reviewed.   Constitutional:       Appearance: Normal appearance. He is well-developed.   HENT:      Head: Normocephalic and atraumatic.      Right Ear: External ear normal.      Left Ear: External ear normal.      Mouth/Throat:      Pharynx: No oropharyngeal exudate.   Eyes:      Conjunctiva/sclera: Conjunctivae normal.      Pupils: Pupils are equal, round, and reactive to light.   Neck:      Vascular: No carotid bruit.   Cardiovascular:      Rate and Rhythm: Normal rate and regular rhythm.      Pulses: Normal pulses.      Heart sounds: Normal heart sounds. No murmur heard.     No friction rub. No gallop.   Pulmonary:      Effort: Pulmonary effort is normal.      Breath sounds: Normal breath sounds. No wheezing or rhonchi.   Skin:     General: Skin is warm and dry.   Neurological:      Mental Status: He is alert and oriented to person, place, and time.      Cranial Nerves: No cranial nerve deficit.   Psychiatric:         Mood and Affect: Mood and affect normal.         Behavior: Behavior normal.         Thought Content: Thought content normal.         Judgment: Judgment " normal.          Result Review        No Images in the past 120 days found..     The above data has been reviewed by PERRI Reynaga 01/08/2024 13:25 EST.          Patient Care Team:  Atiya Hodge APRN as PCP - General (Family Medicine)  Nadeem Kirk MD as Consulting Physician (Gastroenterology)            ASSESSMENT & PLAN    Diagnoses and all orders for this visit:    1. Annual physical exam (Primary)  -     Comprehensive Metabolic Panel; Future  -     CBC & Differential; Future  -     Lipid Panel; Future  -     TSH Rfx On Abnormal To Free T4; Future    2. Hyperhidrosis  Comments:  Symptoms well controlled with current medication regimen, cont. Current meds.  Orders:  -     aluminum chloride (Drysol) 20 % external solution; Apply 1 each topically to the appropriate area as directed Every Night.  Dispense: 35 mL; Refill: 1    3. Gastroesophageal reflux disease without esophagitis  Comments:  Symptoms well controlled with current medication regimen, cont. Current meds.  Orders:  -     omeprazole (priLOSEC) 20 MG capsule; Take 1 capsule by mouth Daily.  Dispense: 90 capsule; Refill: 1    4. Flu vaccine need  -     Fluzone (or Fluarix & Flulaval for VFC) >6 Mos (3533-5337)    5. Weight gain  Comments:  Check lab  Orders:  -     Comprehensive Metabolic Panel; Future  -     CBC & Differential; Future  -     TSH Rfx On Abnormal To Free T4; Future    6. Body mass index (BMI) 40.0-44.9, adult  Assessment & Plan:  Medication options discussed with patient.  Will contact insurance to see if it covers weight loss medications, will consider restarting injectable diabetic medications if has better insurance coverage.      7. Hypertension, unspecified type  Comments:  BP well-controlled on current dose of lisinopril, continue medication    8. Allergic rhinitis due to pollen, unspecified seasonality  Comments:  Symptoms well controlled with current medication regimen, cont. Current meds.  Orders:  -      levocetirizine (XYZAL) 5 MG tablet; Take 1 tablet by mouth Daily.  Dispense: 90 tablet; Refill: 1     The patient is advised to follow a low fat, low cholesterol diet, continue current medications, continue current healthy lifestyle patterns, and return for routine annual checkups.      Tobacco Use: High Risk (1/8/2024)    Patient History     Smoking Tobacco Use: Former     Smokeless Tobacco Use: Current     Passive Exposure: Not on file       Follow Up     Return for Next scheduled follow up.    “Discussed risks/benefits to vaccination, reviewed components of the vaccine, discussed VIS, discussed informed consent, informed consent obtained. Patient/Parent was allowed to accept or refuse vaccine. Questions answered to satisfactory state of patient/Parent. We reviewed typical age appropriate and seasonally appropriate vaccinations. Reviewed immunization history and updated state vaccination form as needed. Patient was counseled on Influenza      Patient was given instructions and counseling regarding his condition or for health maintenance advice. Please see specific information pulled into the AVS if appropriate.   I have reviewed information obtained and documented by others and I have confirmed the accuracy of this documented note.    PERRI Reynaga          Answers submitted by the patient for this visit:  Primary Reason for Visit (Submitted on 1/1/2024)  What is the primary reason for your visit?: Other  Other (Submitted on 1/1/2024)  Please describe your symptoms.: Weight gain  Have you had these symptoms before?: Yes  How long have you been having these symptoms?: Greater than 2 weeks

## 2024-01-29 ENCOUNTER — TELEPHONE (OUTPATIENT)
Dept: FAMILY MEDICINE CLINIC | Facility: CLINIC | Age: 34
End: 2024-01-29
Payer: COMMERCIAL

## 2024-01-29 NOTE — TELEPHONE ENCOUNTER
Caller: Abdiel Terrell    Relationship to patient: Self    Best call back number: [phone]   Patient is needing: \PATIENT WAS RETURNING A PHONE CALL.

## 2024-01-29 NOTE — TELEPHONE ENCOUNTER
Left v/m for patient:  Mounjaro no FDA approved for weight loss.  RO will prescribe Saxenda or Wegovy, recommend patient contact insurance company for benefits/coverage for either of those medications.

## 2024-01-29 NOTE — TELEPHONE ENCOUNTER
Caller: Abdiel Terrell    Relationship: Self    Best call back number:[phone]     Requested Prescriptions:        MOUJANRO (DOESN'T KNOW MG)     Pharmacy where request should be sent:          Last office visit with prescribing clinician: 1/8/2024   Last telemedicine visit with prescribing clinician: Visit date not found   Next office visit with prescribing clinician: 6/11/2024     Additional details provided by patient: PATIENT STATED HE FOUND A MEDICATION THAT HIS INSURANCE WILL TAKE AND ALSO STATED  MEDICATION IS NEEDING PA BEFORE MEDICATION CAN BE FILLED.     PLEASE ADVISE     Does the patient have less than a 3 day supply:  [x] Yes  [] No    Would you like a call back once the refill request has been completed: [] Yes [] No    If the office needs to give you a call back, can they leave a voicemail: [] Yes [] No    Kevin Phillips Rep   01/29/24 09:39 EST

## 2024-01-30 DIAGNOSIS — E11.9 TYPE 2 DIABETES MELLITUS WITHOUT COMPLICATION, WITHOUT LONG-TERM CURRENT USE OF INSULIN: Primary | ICD-10-CM

## 2024-01-30 DIAGNOSIS — E66.01 MORBID (SEVERE) OBESITY DUE TO EXCESS CALORIES: ICD-10-CM

## 2024-01-30 NOTE — TELEPHONE ENCOUNTER
CALLED PT BACK STATES HE CALLED INSURANCE WAS TOLD NO TO FRANCIA BUT THAT THEY WOULD COVER MOUNJARO FOR DM AND WEIGHT LOSS PT WANTS TO PROCEED WITH MOUNJARO BECAUSE HE IS DM

## 2024-01-30 NOTE — TELEPHONE ENCOUNTER
Patient called office to discuss potential for starting Mounjaro with insurance.  I am covering for his PCP today.  I called him back and we discussed Mounjaro and potential benefits and risk, including side effect profile.  He does confirm to me over the phone that he has a diagnosis of type 2 diabetes that he is provider is treating him for he had previously been on Ozempic.  Will prescribe Mounjaro today and I did tell him he would need follow-up with his PCP 4 weeks after starting this to continue to titrate medication.       This document has been electronically signed by Maynor Jeffrey MD on January 30, 2024 13:58 EST

## 2024-03-10 DIAGNOSIS — R61 HYPERHIDROSIS: ICD-10-CM

## 2024-03-11 RX ORDER — ALUMINUM CHLORIDE 20 %
SOLUTION, NON-ORAL TOPICAL
Qty: 35 ML | Refills: 0 | Status: SHIPPED | OUTPATIENT
Start: 2024-03-11

## 2024-03-18 RX ORDER — LISINOPRIL 10 MG/1
10 TABLET ORAL DAILY
Qty: 90 TABLET | Refills: 1 | Status: SHIPPED | OUTPATIENT
Start: 2024-03-18

## 2024-04-01 ENCOUNTER — APPOINTMENT (OUTPATIENT)
Dept: CT IMAGING | Facility: HOSPITAL | Age: 34
End: 2024-04-01
Payer: COMMERCIAL

## 2024-04-01 ENCOUNTER — HOSPITAL ENCOUNTER (EMERGENCY)
Facility: HOSPITAL | Age: 34
Discharge: HOME | End: 2024-04-01
Attending: EMERGENCY MEDICINE | Admitting: EMERGENCY MEDICINE
Payer: COMMERCIAL

## 2024-04-01 VITALS
DIASTOLIC BLOOD PRESSURE: 70 MMHG | TEMPERATURE: 98.3 F | OXYGEN SATURATION: 100 % | RESPIRATION RATE: 16 BRPM | WEIGHT: 294.98 LBS | SYSTOLIC BLOOD PRESSURE: 114 MMHG | BODY MASS INDEX: 37.86 KG/M2 | HEART RATE: 79 BPM | HEIGHT: 74 IN

## 2024-04-01 DIAGNOSIS — R74.8 ELEVATED LIPASE: Primary | ICD-10-CM

## 2024-04-01 LAB
ALBUMIN SERPL-MCNC: 4.6 G/DL (ref 3.5–5.2)
ALBUMIN/GLOB SERPL: 1.4 G/DL
ALP SERPL-CCNC: 22 U/L (ref 39–117)
ALT SERPL W P-5'-P-CCNC: 17 U/L (ref 1–41)
ANION GAP SERPL CALCULATED.3IONS-SCNC: 11.5 MMOL/L (ref 5–15)
AST SERPL-CCNC: 18 U/L (ref 1–40)
BASOPHILS # BLD AUTO: 0.03 10*3/MM3 (ref 0–0.2)
BASOPHILS NFR BLD AUTO: 0.3 % (ref 0–1.5)
BILIRUB SERPL-MCNC: 0.6 MG/DL (ref 0–1.2)
BILIRUB UR QL STRIP: NEGATIVE
BUN SERPL-MCNC: 11 MG/DL (ref 6–20)
BUN/CREAT SERPL: 9 (ref 7–25)
CALCIUM SPEC-SCNC: 9.7 MG/DL (ref 8.6–10.5)
CHLORIDE SERPL-SCNC: 102 MMOL/L (ref 98–107)
CLARITY UR: CLEAR
CO2 SERPL-SCNC: 25.5 MMOL/L (ref 22–29)
COLOR UR: YELLOW
CREAT SERPL-MCNC: 1.22 MG/DL (ref 0.76–1.27)
DEPRECATED RDW RBC AUTO: 42.8 FL (ref 37–54)
EGFRCR SERPLBLD CKD-EPI 2021: 79.3 ML/MIN/1.73
EOSINOPHIL # BLD AUTO: 0.13 10*3/MM3 (ref 0–0.4)
EOSINOPHIL NFR BLD AUTO: 1.4 % (ref 0.3–6.2)
ERYTHROCYTE [DISTWIDTH] IN BLOOD BY AUTOMATED COUNT: 14.9 % (ref 12.3–15.4)
GLOBULIN UR ELPH-MCNC: 3.4 GM/DL
GLUCOSE SERPL-MCNC: 88 MG/DL (ref 65–99)
GLUCOSE UR STRIP-MCNC: NEGATIVE MG/DL
HCT VFR BLD AUTO: 47.6 % (ref 37.5–51)
HGB BLD-MCNC: 14.7 G/DL (ref 13–17.7)
HGB UR QL STRIP.AUTO: NEGATIVE
HOLD SPECIMEN: NORMAL
HOLD SPECIMEN: NORMAL
IMM GRANULOCYTES # BLD AUTO: 0.03 10*3/MM3 (ref 0–0.05)
IMM GRANULOCYTES NFR BLD AUTO: 0.3 % (ref 0–0.5)
KETONES UR QL STRIP: ABNORMAL
LEUKOCYTE ESTERASE UR QL STRIP.AUTO: NEGATIVE
LIPASE SERPL-CCNC: 476 U/L (ref 13–60)
LYMPHOCYTES # BLD AUTO: 1.97 10*3/MM3 (ref 0.7–3.1)
LYMPHOCYTES NFR BLD AUTO: 21.3 % (ref 19.6–45.3)
MCH RBC QN AUTO: 25.2 PG (ref 26.6–33)
MCHC RBC AUTO-ENTMCNC: 30.9 G/DL (ref 31.5–35.7)
MCV RBC AUTO: 81.6 FL (ref 79–97)
MONOCYTES # BLD AUTO: 0.72 10*3/MM3 (ref 0.1–0.9)
MONOCYTES NFR BLD AUTO: 7.8 % (ref 5–12)
NEUTROPHILS NFR BLD AUTO: 6.38 10*3/MM3 (ref 1.7–7)
NEUTROPHILS NFR BLD AUTO: 68.9 % (ref 42.7–76)
NITRITE UR QL STRIP: NEGATIVE
NRBC BLD AUTO-RTO: 0 /100 WBC (ref 0–0.2)
PH UR STRIP.AUTO: 6 [PH] (ref 5–8)
PLATELET # BLD AUTO: 281 10*3/MM3 (ref 140–450)
PMV BLD AUTO: 9.2 FL (ref 6–12)
POTASSIUM SERPL-SCNC: 4.7 MMOL/L (ref 3.5–5.2)
PROT SERPL-MCNC: 8 G/DL (ref 6–8.5)
PROT UR QL STRIP: NEGATIVE
RBC # BLD AUTO: 5.83 10*6/MM3 (ref 4.14–5.8)
SODIUM SERPL-SCNC: 139 MMOL/L (ref 136–145)
SP GR UR STRIP: 1.02 (ref 1–1.03)
UROBILINOGEN UR QL STRIP: ABNORMAL
WBC NRBC COR # BLD AUTO: 9.26 10*3/MM3 (ref 3.4–10.8)
WHOLE BLOOD HOLD COAG: NORMAL
WHOLE BLOOD HOLD SPECIMEN: NORMAL

## 2024-04-01 PROCEDURE — 25510000001 IOPAMIDOL PER 1 ML: Performed by: EMERGENCY MEDICINE

## 2024-04-01 PROCEDURE — 85025 COMPLETE CBC W/AUTO DIFF WBC: CPT | Performed by: EMERGENCY MEDICINE

## 2024-04-01 PROCEDURE — 80053 COMPREHEN METABOLIC PANEL: CPT | Performed by: EMERGENCY MEDICINE

## 2024-04-01 PROCEDURE — 74177 CT ABD & PELVIS W/CONTRAST: CPT

## 2024-04-01 PROCEDURE — 25810000003 SODIUM CHLORIDE 0.9 % SOLUTION

## 2024-04-01 PROCEDURE — 81003 URINALYSIS AUTO W/O SCOPE: CPT | Performed by: EMERGENCY MEDICINE

## 2024-04-01 PROCEDURE — 36415 COLL VENOUS BLD VENIPUNCTURE: CPT

## 2024-04-01 PROCEDURE — 83690 ASSAY OF LIPASE: CPT | Performed by: EMERGENCY MEDICINE

## 2024-04-01 PROCEDURE — 99285 EMERGENCY DEPT VISIT HI MDM: CPT

## 2024-04-01 RX ORDER — ONDANSETRON 4 MG/1
4 TABLET, ORALLY DISINTEGRATING ORAL EVERY 8 HOURS PRN
Qty: 15 TABLET | Refills: 0 | Status: SHIPPED | OUTPATIENT
Start: 2024-04-01 | End: 2024-04-06

## 2024-04-01 RX ORDER — SODIUM CHLORIDE 0.9 % (FLUSH) 0.9 %
10 SYRINGE (ML) INJECTION AS NEEDED
Status: DISCONTINUED | OUTPATIENT
Start: 2024-04-01 | End: 2024-04-01 | Stop reason: HOSPADM

## 2024-04-01 RX ADMIN — SODIUM CHLORIDE 1000 ML: 9 INJECTION, SOLUTION INTRAVENOUS at 11:49

## 2024-04-01 RX ADMIN — IOPAMIDOL 100 ML: 755 INJECTION, SOLUTION INTRAVENOUS at 11:42

## 2024-04-01 RX ADMIN — SODIUM CHLORIDE 1000 ML: 9 INJECTION, SOLUTION INTRAVENOUS at 12:34

## 2024-04-01 NOTE — ED PROVIDER NOTES
Time: 10:51 AM EDT  Date of encounter:  4/1/2024  Independent Historian/Clinical History and Information was obtained by:   Patient    History is limited by: N/A    Chief Complaint   Patient presents with    Abdominal Pain    Nausea    Vomiting    Diarrhea         History of Present Illness:  Patient is a 34 y.o. year old male who presents to the emergency department for evaluation of epigastric and right upper quadrant pain that began last night.  Patient admits to nausea but denies vomiting.  Patient states that he has been dealing with gallbladder issues for the last 5 years.  He still has his gallbladder.  He does have a history of appendectomy.  Denies any fever, dysuria.  Patient denies any alcohol abuse.      Patient Care Team  Primary Care Provider: Atiya Hodge APRN    Past Medical History:     Allergies   Allergen Reactions    Morphine Hives and Unknown - Low Severity    Penicillins Hives     Past Medical History:   Diagnosis Date    Anxiety     Diabetes mellitus     type II, average  or below    GERD (gastroesophageal reflux disease)     Lower back pain      Past Surgical History:   Procedure Laterality Date    APPENDECTOMY      COLONOSCOPY      ENDOSCOPY N/A 04/18/2022    Procedure: ESOPHAGOGASTRODUODENOSCOPY WITH BIOPSIES;  Surgeon: Nadeem Kirk MD;  Location: [facility] ENDOSCOPY;  Service: Gastroenterology;  Laterality: N/A;  NORMAL EGD    FEMUR SURGERY Right     x2    LUMBAR LAMINECTOMY Left 10/21/2022    Procedure: MINIMALLY INVASIVE LUMBAR LAMINECTOMY AND DISCECTOMY, LEFT APPROACH LUMBAR 4-LUMBAR 5;  Surgeon: Rex Reis MD;  Location: [facility] MAIN OR;  Service: Neurosurgery;  Laterality: Left;    NASAL SEPTUM SURGERY      TONSILLECTOMY      UPPER GASTROINTESTINAL ENDOSCOPY      VASECTOMY      3/11/22     Family History   Problem Relation Age of Onset    Cancer Mother     Cancer Father     Cancer Son     Colon cancer Neg Hx     Malig Hyperthermia Neg Hx        Home  Medications:  Prior to Admission medications    Medication Sig Start Date End Date Taking? Authorizing Provider   Drysol 20 % external solution APPLY 1 EACH TO AFFECTED AREA AT NIGHT 3/11/24   Atiya Hodge APRN   levocetirizine (XYZAL) 5 MG tablet Take 1 tablet by mouth Daily. 24   Atiya Hodge APRN   lisinopril (PRINIVIL,ZESTRIL) 10 MG tablet Take 1 tablet by mouth once daily 3/18/24   Atiya Hodge APRN   metFORMIN ER (GLUCOPHAGE-XR) 500 MG 24 hr tablet Take 1 tablet by mouth Daily With Breakfast. 23   Atiya Hodge APRN   omeprazole (priLOSEC) 20 MG capsule Take 1 capsule by mouth Daily. 24   Atiya Hodge APRN   Tirzepatide (MOUNJARO) 2.5 MG/0.5ML solution pen-injector pen Inject 0.5 mL under the skin into the appropriate area as directed 1 (One) Time Per Week. 24   Maynor Jeffrey MD        Social History:   Social History     Tobacco Use    Smoking status: Former     Current packs/day: 0.00     Average packs/day: 1 pack/day for 13.1 years (13.1 ttl pk-yrs)     Types: Cigarettes     Start date: 2004     Quit date: 2017     Years since quittin.1    Smokeless tobacco: Current     Types: Chew    Tobacco comments:     Used last, Wednesday   Vaping Use    Vaping status: Never Used   Substance Use Topics    Alcohol use: Yes     Alcohol/week: 2.0 standard drinks of alcohol     Types: 2 Cans of beer per week     Comment: occ    Drug use: Never         Review of Systems:  Review of Systems   Constitutional:  Negative for chills and fever.   HENT:  Negative for congestion, rhinorrhea and sore throat.    Eyes:  Negative for pain and visual disturbance.   Respiratory:  Negative for apnea, cough, chest tightness and shortness of breath.    Cardiovascular:  Negative for chest pain and palpitations.   Gastrointestinal:  Positive for abdominal pain and nausea. Negative for diarrhea and vomiting.   Genitourinary:  Negative for difficulty urinating and dysuria.  "  Musculoskeletal:  Negative for joint swelling and myalgias.   Skin:  Negative for color change.   Neurological:  Negative for seizures and headaches.   Psychiatric/Behavioral: Negative.     All other systems reviewed and are negative.       Physical Exam:  /62   Pulse 81   Temp 98.1 °F (36.7 °C) (Oral)   Resp 16   Ht 188 cm (74\")   Wt 134 kg (294 lb 15.6 oz)   SpO2 99%   BMI 37.87 kg/m²         Physical Exam  Vitals and nursing note reviewed.   Constitutional:       General: He is not in acute distress.     Appearance: Normal appearance. He is not toxic-appearing.   HENT:      Head: Normocephalic and atraumatic.      Jaw: There is normal jaw occlusion.   Eyes:      General: Lids are normal.      Extraocular Movements: Extraocular movements intact.      Conjunctiva/sclera: Conjunctivae normal.      Pupils: Pupils are equal, round, and reactive to light.   Cardiovascular:      Rate and Rhythm: Normal rate and regular rhythm.      Pulses: Normal pulses.      Heart sounds: Normal heart sounds.   Pulmonary:      Effort: Pulmonary effort is normal. No respiratory distress.      Breath sounds: Normal breath sounds. No wheezing or rhonchi.   Abdominal:      General: Abdomen is flat. There is no distension.      Palpations: Abdomen is soft.      Tenderness: There is abdominal tenderness in the right upper quadrant and epigastric area. There is no guarding or rebound.   Musculoskeletal:         General: Normal range of motion.      Cervical back: Normal range of motion and neck supple.      Right lower leg: No edema.      Left lower leg: No edema.   Skin:     General: Skin is warm and dry.      Coloration: Skin is not cyanotic.   Neurological:      Mental Status: He is alert and oriented to person, place, and time. Mental status is at baseline.   Psychiatric:         Attention and Perception: Attention and perception normal.         Mood and Affect: Mood normal.                "       Procedures:  Procedures      Medical Decision Making:      Comorbidities that affect care:    Diabetes    External Notes reviewed:          The following orders were placed and all results were independently analyzed by me:  Orders Placed This Encounter   Procedures    CT Abdomen Pelvis With Contrast    [place] Draw    Comprehensive Metabolic Panel    Lipase    Urinalysis With Microscopic If Indicated (No Culture) - Urine, Clean Catch    CBC Auto Differential    Ambulatory Referral to Gastroenterology    NPO Diet NPO Type: Strict NPO    Undress & Gown    Insert Peripheral IV    CBC & Differential    Green Top (Gel)    Lavender Top    Gold Top - SST    Light Blue Top       Medications Given in the Emergency Department:  Medications   sodium chloride 0.9 % flush 10 mL (has no administration in time range)   sodium chloride 0.9 % bolus 1,000 mL (1,000 mL Intravenous New Bag 4/1/24 1234)   sodium chloride 0.9 % bolus 1,000 mL (1,000 mL Intravenous New Bag 4/1/24 1149)   iopamidol (ISOVUE-370) 76 % injection 100 mL (100 mL Intravenous Given 4/1/24 1142)        ED Course:    The patient was initially evaluated in the triage area where orders were placed. The patient was later dispositioned by Wisam Hoffmann PA-C.      The patient was advised to stay for completion of workup which includes but is not limited to communication of labs and radiological results, reassessment and plan. The patient was advised that leaving prior to disposition by a provider could result in critical findings that are not communicated to the patient.     ED Course as of 04/01/24 1241   Mon Apr 01, 2024   1053 PROVIDER IN TRIAGE  Patient was evaluated by Armand martinez PA-C. Orders were placed and awaiting final results and disposition.   [MV]      ED Course User Index  [MV] Armand Albert PA       Labs:    Lab Results (last 24 hours)       Procedure Component Value Units Date/Time    CBC & Differential [[id number]]  (Abnormal) Collected:  04/01/24 1054    Specimen: Blood Updated: 04/01/24 1106    Narrative:      The following orders were created for panel order CBC & Differential.  Procedure                               Abnormality         Status                     ---------                               -----------         ------                     CBC Auto Differential[[id number]]        Abnormal            Final result                 Please view results for these tests on the individual orders.    Comprehensive Metabolic Panel [[id number]]  (Abnormal) Collected: 04/01/24 1054    Specimen: Blood Updated: 04/01/24 1127     Glucose 88 mg/dL      BUN 11 mg/dL      Creatinine 1.22 mg/dL      Sodium 139 mmol/L      Potassium 4.7 mmol/L      Chloride 102 mmol/L      CO2 25.5 mmol/L      Calcium 9.7 mg/dL      Total Protein 8.0 g/dL      Albumin 4.6 g/dL      ALT (SGPT) 17 U/L      AST (SGOT) 18 U/L      Alkaline Phosphatase 22 U/L      Total Bilirubin 0.6 mg/dL      Globulin 3.4 gm/dL      A/G Ratio 1.4 g/dL      BUN/Creatinine Ratio 9.0     Anion Gap 11.5 mmol/L      eGFR 79.3 mL/min/1.73     Narrative:      GFR Normal >60  Chronic Kidney Disease <60  Kidney Failure <15      Lipase [[id number]]  (Abnormal) Collected: 04/01/24 1054    Specimen: Blood Updated: 04/01/24 1134     Lipase 476 U/L     CBC Auto Differential [[id number]]  (Abnormal) Collected: 04/01/24 1054    Specimen: Blood Updated: 04/01/24 1106     WBC 9.26 10*3/mm3      RBC 5.83 10*6/mm3      Hemoglobin 14.7 g/dL      Hematocrit 47.6 %      MCV 81.6 fL      MCH 25.2 pg      MCHC 30.9 g/dL      RDW 14.9 %      RDW-SD 42.8 fl      MPV 9.2 fL      Platelets 281 10*3/mm3      Neutrophil % 68.9 %      Lymphocyte % 21.3 %      Monocyte % 7.8 %      Eosinophil % 1.4 %      Basophil % 0.3 %      Immature Grans % 0.3 %      Neutrophils, Absolute 6.38 10*3/mm3      Lymphocytes, Absolute 1.97 10*3/mm3      Monocytes, Absolute 0.72 10*3/mm3      Eosinophils, Absolute 0.13 10*3/mm3      Basophils,  Absolute 0.03 10*3/mm3      Immature Grans, Absolute 0.03 10*3/mm3      nRBC 0.0 /100 WBC     Urinalysis With Microscopic If Indicated (No Culture) - Urine, Clean Catch [[id number]]  (Abnormal) Collected: 04/01/24 1122    Specimen: Urine, Clean Catch Updated: 04/01/24 1130     Color, UA Yellow     Appearance, UA Clear     pH, UA 6.0     Specific Gravity, UA 1.023     Glucose, UA Negative     Ketones, UA Trace     Bilirubin, UA Negative     Blood, UA Negative     Protein, UA Negative     Leuk Esterase, UA Negative     Nitrite, UA Negative     Urobilinogen, UA 1.0 E.U./dL    Narrative:      Urine microscopic not indicated.             Imaging:    CT Abdomen Pelvis With Contrast    Result Date: 4/1/2024  CT ABDOMEN PELVIS W CONTRAST-  Date of Exam: 4/1/2024 11:35 AM  Indication: Epigastric and RUQ pain.  Comparison: October 10, 2021  Technique: Axial CT images were obtained of the abdomen and pelvis following the uneventful intravenous administration of 100 mL Isovue-370. Reconstructed coronal and sagittal images were also obtained. Automated exposure control and iterative construction methods were used.   Findings: The lung bases are clear.  The liver, gallbladder, adrenal glands, kidneys, spleen, and pancreas are unremarkable.  The stomach appears normal. The small bowel appears normal in caliber and configuration. There is sigmoid diverticulosis. The appendix is not well visualized. There is no ascites or loculated collection. No abnormally enlarged lymph nodes are identified.  The rectum, prostate, and the urinary bladder are unremarkable.  No aggressive osseous lesions are identified. There are changes from prior surgical changes in the proximal right femur.      Impression: 1.  No acute process identified within abdomen/pelvis. 2.  Sigmoid diverticulosis.    Electronically Signed By-Dean Rivas MD On:4/1/2024 11:57 AM         Differential Diagnosis and Discussion:      Abdominal Pain: Based on the patient's  signs and symptoms, I considered abdominal aortic aneurysm, small bowel obstruction, pancreatitis, acute cholecystitis, acute appendecitis, peptic ulcer disease, gastritis, colitis, endocrine disorders, irritable bowel syndrome and other differential diagnosis an etiology of the patient's abdominal pain.    All labs were reviewed and interpreted by me.  CT scan radiology impression was interpreted by me.    MDM     Amount and/or Complexity of Data Reviewed  Clinical lab tests: reviewed  Decide to obtain previous medical records or to obtain history from someone other than the patient: yes       CBC shows no evidence of leukocytosis.  The patient´s CMP that was reviewed and interpretted by me shows no abnormalities of critical concern. Of note, the patient´s sodium and potassium are acceptable. The patient´s liver enzymes are unremarkable. The patient´s renal function (creatinine) is preserved. The patient has a normal anion gap.  Lipase is elevated at 476.  Patient denies alcohol consumption.  CT abdomen pelvis with contrast shows no acute process.  I gave the patient 2 L of fluids in the emergency department and will refer to gastroenterology.  Patient denies abdominal pain at this time.  Patient has not vomited once in the ED.  I emphasized the importance of oral intake of fluids.  I instructed patient to return to ED if he develops any new or worsening symptoms.  Patient states he understands and agrees with plan of care.          Patient Care Considerations:    I considered admitting the patient due to the elevated lipase level but patient was nontender on his abdomen after receiving fluids and has not vomited once in the ED.  Patient states he did not want to be admitted.      Consultants/Shared Management Plan:        Social Determinants of Health:    Patient is independent, reliable, and has access to care.       Disposition and Care Coordination:    Discharged: The patient is suitable and stable for  discharge with no need for consideration of admission.    I have explained the patient´s condition, diagnoses and treatment plan based on the information available to me at this time. I have answered questions and addressed any concerns. The patient has a good  understanding of the patient´s diagnosis, condition, and treatment plan as can be expected at this point. The vital signs have been stable. The patient´s condition is stable and appropriate for discharge from the emergency department.      The patient will pursue further outpatient evaluation with the primary care physician or other designated or consulting physician as outlined in the discharge instructions. They are agreeable to this plan of care and follow-up instructions have been explained in detail. The patient has received these instructions in written format and has expressed an understanding of the discharge instructions. The patient is aware that any significant change in condition or worsening of symptoms should prompt an immediate return to this or the closest emergency department or call to 911.  I have explained discharge medications and the need for follow up with the patient/caretakers. This was also printed in the discharge instructions. Patient was discharged with the following medications and follow up:      Medication List        New Prescriptions      ondansetron ODT 4 MG disintegrating tablet  Commonly known as: ZOFRAN-ODT  Place 1 tablet on the tongue Every 8 (Eight) Hours As Needed for Nausea for up to 5 days.               Where to Get Your Medications        These medications were sent to [address], [place] - [address] [facility] - [phone]  - [phone]   [address] [facility] [place] [address]      Phone: [phone]   ondansetron ODT 4 MG disintegrating tablet      Nadeem Kirk MD  [address] [facility] [address] [place] [address] [phone]    Schedule an appointment as soon as  possible for a visit in 3 days  Follow-up    Atiya Hodge, APRN  [address] [facility] [address]  [place] [address]  [phone]    Schedule an appointment as soon as possible for a visit in 2 days  Serial lab draw       Final diagnoses:   Elevated lipase        ED Disposition       ED Disposition   Discharge    Condition   Stable    Comment   --               This medical record created using voice recognition software.             Wisam Hoffmann PA-C  04/01/24 1240

## 2024-04-03 ENCOUNTER — HOSPITAL ENCOUNTER (EMERGENCY)
Facility: HOSPITAL | Age: 34
Discharge: HOME | End: 2024-04-04
Attending: EMERGENCY MEDICINE
Payer: COMMERCIAL

## 2024-04-03 ENCOUNTER — APPOINTMENT (OUTPATIENT)
Dept: ULTRASOUND IMAGING | Facility: HOSPITAL | Age: 34
End: 2024-04-03
Payer: COMMERCIAL

## 2024-04-03 DIAGNOSIS — K40.90 UNILATERAL INGUINAL HERNIA WITHOUT OBSTRUCTION OR GANGRENE, RECURRENCE NOT SPECIFIED: Primary | ICD-10-CM

## 2024-04-03 PROCEDURE — 76870 US EXAM SCROTUM: CPT

## 2024-04-03 PROCEDURE — 99284 EMERGENCY DEPT VISIT MOD MDM: CPT

## 2024-04-03 NOTE — Clinical Note
[facility] EMERGENCY ROOM  [address] [address]  Phone: [phone]  Fax: [phone]    Abdiel Terrell was seen and treated in our emergency department on 4/3/2024.  He may return to work on 04/06/2024.         Thank you for choosing [facility].    Giuliana Dang, APRN

## 2024-04-04 VITALS
SYSTOLIC BLOOD PRESSURE: 108 MMHG | WEIGHT: 300.71 LBS | OXYGEN SATURATION: 96 % | HEIGHT: 74 IN | RESPIRATION RATE: 20 BRPM | TEMPERATURE: 99 F | DIASTOLIC BLOOD PRESSURE: 68 MMHG | BODY MASS INDEX: 38.59 KG/M2 | HEART RATE: 82 BPM

## 2024-04-04 LAB
BILIRUB UR QL STRIP: NEGATIVE
CLARITY UR: CLEAR
COLOR UR: YELLOW
GLUCOSE UR STRIP-MCNC: NEGATIVE MG/DL
HGB UR QL STRIP.AUTO: NEGATIVE
KETONES UR QL STRIP: ABNORMAL
LEUKOCYTE ESTERASE UR QL STRIP.AUTO: NEGATIVE
NITRITE UR QL STRIP: NEGATIVE
PH UR STRIP.AUTO: 5.5 [PH] (ref 5–8)
PROT UR QL STRIP: NEGATIVE
SP GR UR STRIP: 1.03 (ref 1–1.03)
UROBILINOGEN UR QL STRIP: ABNORMAL

## 2024-04-04 PROCEDURE — 81003 URINALYSIS AUTO W/O SCOPE: CPT | Performed by: EMERGENCY MEDICINE

## 2024-04-04 RX ORDER — OXYCODONE HYDROCHLORIDE AND ACETAMINOPHEN 5; 325 MG/1; MG/1
1 TABLET ORAL ONCE
Status: COMPLETED | OUTPATIENT
Start: 2024-04-04 | End: 2024-04-04

## 2024-04-04 RX ADMIN — OXYCODONE AND ACETAMINOPHEN 1 TABLET: 5; 325 TABLET ORAL at 01:18

## 2024-04-04 NOTE — ED PROVIDER NOTES
Time: 11:40 PM EDT  Date of encounter:  4/3/2024  Independent Historian/Clinical History and Information was obtained by:   Patient    History is limited by: N/A    Chief Complaint   Patient presents with    Testicle Pain         History of Present Illness:  Patient is a 34 y.o. year old male who presents to the emergency department for evaluation of right testicle pain that radiates up to right lower abdomen.  Patient states yesterday he was carrying a 20 foot ladder and he almost dropped it.  In order to keep from dropping it he Neubert and twisting like fashion to the right and developed the discomfort shortly after that.  He denies dysuria. (PERRI Duggan)      Patient Care Team  Primary Care Provider: Atiya Hodge APRN    Past Medical History:     Allergies   Allergen Reactions    Morphine Hives and Unknown - Low Severity    Penicillins Hives     Past Medical History:   Diagnosis Date    Anxiety     Diabetes mellitus     type II, average  or below    GERD (gastroesophageal reflux disease)     Lower back pain      Past Surgical History:   Procedure Laterality Date    APPENDECTOMY      COLONOSCOPY      ENDOSCOPY N/A 04/18/2022    Procedure: ESOPHAGOGASTRODUODENOSCOPY WITH BIOPSIES;  Surgeon: Nadeem Kirk MD;  Location: [facility] ENDOSCOPY;  Service: Gastroenterology;  Laterality: N/A;  NORMAL EGD    FEMUR SURGERY Right     x2    LUMBAR LAMINECTOMY Left 10/21/2022    Procedure: MINIMALLY INVASIVE LUMBAR LAMINECTOMY AND DISCECTOMY, LEFT APPROACH LUMBAR 4-LUMBAR 5;  Surgeon: Rex Reis MD;  Location: [facility] MAIN OR;  Service: Neurosurgery;  Laterality: Left;    NASAL SEPTUM SURGERY      TONSILLECTOMY      UPPER GASTROINTESTINAL ENDOSCOPY      VASECTOMY      3/11/22     Family History   Problem Relation Age of Onset    Cancer Mother     Cancer Father     Cancer Son     Colon cancer Neg Hx     Malig Hyperthermia Neg Hx        Home Medications:  Prior to Admission medications     Medication Sig Start Date End Date Taking? Authorizing Provider   Drysol 20 % external solution APPLY 1 EACH TO AFFECTED AREA AT NIGHT 3/11/24   Atiya Hodge APRN   levocetirizine (XYZAL) 5 MG tablet Take 1 tablet by mouth Daily. 24   Atiya Hodge APRN   lisinopril (PRINIVIL,ZESTRIL) 10 MG tablet Take 1 tablet by mouth once daily 3/18/24   Atiya Hodge APRN   metFORMIN ER (GLUCOPHAGE-XR) 500 MG 24 hr tablet Take 1 tablet by mouth Daily With Breakfast. 23   Atiya Hodge APRN   omeprazole (priLOSEC) 20 MG capsule Take 1 capsule by mouth Daily. 24   Atiya Hodge APRN   ondansetron ODT (ZOFRAN-ODT) 4 MG disintegrating tablet Place 1 tablet on the tongue Every 8 (Eight) Hours As Needed for Nausea for up to 5 days. 24  Wisam Hoffmann PA-C   Tirzepatide (MOUNJARO) 2.5 MG/0.5ML solution pen-injector pen Inject 0.5 mL under the skin into the appropriate area as directed 1 (One) Time Per Week. 24   Maynor Jeffrey MD        Social History:   Social History     Tobacco Use    Smoking status: Former     Current packs/day: 0.00     Average packs/day: 1 pack/day for 13.1 years (13.1 ttl pk-yrs)     Types: Cigarettes     Start date: 2004     Quit date: 2017     Years since quittin.1    Smokeless tobacco: Current     Types: Chew    Tobacco comments:     Used last, Wednesday   Vaping Use    Vaping status: Never Used   Substance Use Topics    Alcohol use: Yes     Alcohol/week: 2.0 standard drinks of alcohol     Types: 2 Cans of beer per week     Comment: occ    Drug use: Never         Review of Systems:  Review of Systems   Constitutional:  Negative for chills, fatigue and fever.   HENT:  Negative for ear pain, rhinorrhea and sore throat.    Eyes:  Negative for visual disturbance.   Respiratory:  Negative for cough and shortness of breath.    Cardiovascular:  Negative for chest pain.   Gastrointestinal:  Negative for abdominal pain, diarrhea and vomiting.  "  Genitourinary:  Positive for testicular pain. Negative for difficulty urinating.   Musculoskeletal:  Negative for arthralgias, back pain and myalgias.   Skin:  Negative for rash.   Neurological:  Negative for light-headedness and headaches.   Hematological:  Negative for adenopathy.   Psychiatric/Behavioral: Negative.          Physical Exam:  /68 (BP Location: Right arm, Patient Position: Lying)   Pulse 82   Temp 99 °F (37.2 °C)   Resp 20   Ht 188 cm (74\")   Wt (!) 136 kg (300 lb 11.3 oz)   SpO2 96%   BMI 38.61 kg/m²         Physical Exam  Vitals and nursing note reviewed.   Constitutional:       General: He is not in acute distress.     Appearance: Normal appearance. He is not toxic-appearing.   HENT:      Head: Normocephalic and atraumatic.      Nose: Nose normal.      Mouth/Throat:      Mouth: Mucous membranes are moist.   Eyes:      Conjunctiva/sclera: Conjunctivae normal.   Cardiovascular:      Rate and Rhythm: Normal rate and regular rhythm.      Pulses: Normal pulses.      Heart sounds: Normal heart sounds.   Pulmonary:      Effort: Pulmonary effort is normal.      Breath sounds: Normal breath sounds.   Abdominal:      General: Bowel sounds are normal.      Palpations: Abdomen is soft.      Tenderness: There is no abdominal tenderness.   Genitourinary:     Testes:         Right: Mass (abscent  with laying flat) and swelling present.   Musculoskeletal:         General: Normal range of motion.      Cervical back: Normal range of motion.   Skin:     General: Skin is warm and dry.   Neurological:      General: No focal deficit present.      Mental Status: He is alert and oriented to person, place, and time.   Psychiatric:         Mood and Affect: Mood normal.         Behavior: Behavior normal.         Thought Content: Thought content normal.         Judgment: Judgment normal.                      Procedures:  Procedures      Medical Decision Making:      Comorbidities that affect " care:    None    External Notes reviewed:    None      The following orders were placed and all results were independently analyzed by me:  Orders Placed This Encounter   Procedures    US Scrotum & Testicles    Urinalysis With Microscopic If Indicated (No Culture) - Urine, Clean Catch       Medications Given in the Emergency Department:  Medications   oxyCODONE-acetaminophen (PERCOCET) 5-325 MG per tablet 1 tablet (1 tablet Oral Given 4/4/24 0118)        ED Course:    The patient was initially evaluated in the triage area where orders were placed. The patient was later dispositioned by PERRI Lee.      The patient was advised to stay for completion of workup which includes but is not limited to communication of labs and radiological results, reassessment and plan. The patient was advised that leaving prior to disposition by a provider could result in critical findings that are not communicated to the patient.     ED Course as of 04/04/24 0211 Wed Apr 03, 2024   2342   --- PROVIDER IN TRIAGE NOTE ---    Patient was seen and evaluated in triage by PERRI Ash.  Orders were written and the patient is currently awaiting disposition.   [MS]   2342 Chaperone present during exam, there was a palpable nodule in the right testicle and some edema.  There was tenderness.  Findings are questionable for possible hernia but ultrasound ordered to rule out torsion. [MS]      ED Course User Index  [MS] Joselyn Cronin APRN       Labs:    Lab Results (last 24 hours)       Procedure Component Value Units Date/Time    Urinalysis With Microscopic If Indicated (No Culture) - Urine, Clean Catch [[id number]]  (Abnormal) Collected: 04/04/24 0028    Specimen: Urine, Clean Catch Updated: 04/04/24 0044     Color, UA Yellow     Appearance, UA Clear     pH, UA 5.5     Specific Gravity, UA 1.029     Glucose, UA Negative     Ketones, UA Trace     Bilirubin, UA Negative     Blood, UA Negative     Protein, UA Negative      Leuk Esterase, UA Negative     Nitrite, UA Negative     Urobilinogen, UA 0.2 E.U./dL    Narrative:      Urine microscopic not indicated.             Imaging:    US Scrotum & Testicles    Result Date: 4/4/2024  SCROTAL ULTRASOUND, INCLUDING DOPPLER ULTRASOUND VASCULAR EVALUATION  HISTORY: Testicular pain for 2 days.  TECHNIQUE: High-resolution grayscale ultrasound imaging of the scrotal contents was performed. Vascular evaluation was performed using gray scale, spectral Doppler and color flow Doppler ultrasound imaging.  COMPARISON: 10/10/2021  FINDINGS: Both testes are normal in size and ultrasound appearance. No mass or other focal testicular lesion is identified. The right testicle measures 5.2 cm. The left testicle measures 5.2 cm as well.  The intrascrotal extratesticular contents are also normal in appearance. No epididymis enlargement, scrotal mass or significant fluid collection is demonstrated.  Vascular evaluation shows normal, bilaterally symmetric blood flow to each testis and each epididymis. There is no evidence of testicular torsion, acute epididymitis or acute orchitis.      Normal Scrotal Ultrasound  Electronically Signed By-Dr. Randy Rutherford MD On:4/4/2024 12:26 AM         Differential Diagnosis and Discussion:      Testicular Pain: Differential diagnosis includes but is not limited to epididymitis, orchitis, testicular torsion, testicular tumor, testicular trauma, hydrocele, varicocele, spermatocele, prostatitis, scrotal cellulitis, and urolithiasis.    Ultrasound impression was interpreted by me.     MDM  Number of Diagnoses or Management Options  Unilateral inguinal hernia without obstruction or gangrene, recurrence not specified  Diagnosis management comments: I have explained the patient´s condition, diagnoses and treatment plan based on the information available to me at this time. I have answered questions and addressed any concerns. The patient has a good  understanding of the  patient´s diagnosis, condition, and treatment plan as can be expected at this point. The vital signs have been stable. The patient´s condition is stable and appropriate for discharge from the emergency department.      The patient will pursue further outpatient evaluation with the primary care physician or other designated or consulting physician as outlined in the discharge instructions. They are agreeable to this plan of care and follow-up instructions have been explained in detail. The patient has received these instructions in written format and has expressed an understanding of the discharge instructions. The patient is aware that any significant change in condition or worsening of symptoms should prompt an immediate return to this or the closest emergency department or call to 911.         Amount and/or Complexity of Data Reviewed  Clinical lab tests: reviewed  Tests in the radiology section of CPT®: reviewed           Patient Care Considerations:    ANTIBIOTICS: I considered prescribing antibiotics as an outpatient however no bacterial focus of infection was found.      Consultants/Shared Management Plan:    None    Social Determinants of Health:    Patient is independent, reliable, and has access to care.       Disposition and Care Coordination:    Discharged: The patient is suitable and stable for discharge with no need for consideration of admission.    I have explained the patient´s condition, diagnoses and treatment plan based on the information available to me at this time. I have answered questions and addressed any concerns. The patient has a good  understanding of the patient´s diagnosis, condition, and treatment plan as can be expected at this point. The vital signs have been stable. The patient´s condition is stable and appropriate for discharge from the emergency department.      The patient will pursue further outpatient evaluation with the primary care physician or other designated or consulting  physician as outlined in the discharge instructions. They are agreeable to this plan of care and follow-up instructions have been explained in detail. The patient has received these instructions in written format and has expressed an understanding of the discharge instructions. The patient is aware that any significant change in condition or worsening of symptoms should prompt an immediate return to this or the closest emergency department or call to 911.  I have explained discharge medications and the need for follow up with the patient/caretakers. This was also printed in the discharge instructions. Patient was discharged with the following medications and follow up:      Medication List      No changes were made to your prescriptions during this visit.      Atiya Hodge, APRN  [address]  [address]  [facility] [address]  [phone]    Go to   As needed    Scott Hidalgo MD  [address] [facility] [address], [place]) [address]  [facility] [address]  [phone]    Schedule an appointment as soon as possible for a visit          Final diagnoses:   Unilateral inguinal hernia without obstruction or gangrene, recurrence not specified        ED Disposition       ED Disposition   Discharge    Condition   Stable    Comment   --               This medical record created using voice recognition software.             Giuliana Dang, APRN  04/04/24 0211

## 2024-04-04 NOTE — DISCHARGE INSTRUCTIONS
Please follow-up with your primary care provider as needed.  Please call to schedule appointment with general surgeon as soon as possible.  The number has been provided to you on this discharge packet.  Return to the ED for worsening of pain, fevers greater than 100.4, intractable vomiting.

## 2024-04-08 DIAGNOSIS — R61 HYPERHIDROSIS: ICD-10-CM

## 2024-04-08 RX ORDER — ALUMINUM CHLORIDE 20 %
SOLUTION, NON-ORAL TOPICAL
Qty: 35 ML | Refills: 1 | Status: SHIPPED | OUTPATIENT
Start: 2024-04-08

## 2024-04-10 ENCOUNTER — OFFICE VISIT (OUTPATIENT)
Dept: FAMILY MEDICINE CLINIC | Facility: CLINIC | Age: 34
End: 2024-04-10
Payer: COMMERCIAL

## 2024-04-10 ENCOUNTER — LAB (OUTPATIENT)
Dept: LAB | Facility: HOSPITAL | Age: 34
End: 2024-04-10
Payer: COMMERCIAL

## 2024-04-10 VITALS
WEIGHT: 295 LBS | DIASTOLIC BLOOD PRESSURE: 63 MMHG | HEART RATE: 65 BPM | BODY MASS INDEX: 37.86 KG/M2 | SYSTOLIC BLOOD PRESSURE: 119 MMHG | OXYGEN SATURATION: 100 % | HEIGHT: 74 IN | TEMPERATURE: 98 F

## 2024-04-10 DIAGNOSIS — Z09 ENCOUNTER FOR EXAMINATION FOLLOWING TREATMENT AT HOSPITAL: Primary | ICD-10-CM

## 2024-04-10 DIAGNOSIS — R10.84 GENERALIZED ABDOMINAL PAIN: ICD-10-CM

## 2024-04-10 DIAGNOSIS — R74.8 ELEVATED LIPASE: ICD-10-CM

## 2024-04-10 DIAGNOSIS — E11.65 TYPE 2 DIABETES MELLITUS WITH HYPERGLYCEMIA, WITHOUT LONG-TERM CURRENT USE OF INSULIN: ICD-10-CM

## 2024-04-10 LAB
HBA1C MFR BLD: 5.9 % (ref 4.8–5.6)
LIPASE SERPL-CCNC: 71 U/L (ref 13–60)

## 2024-04-10 PROCEDURE — 83013 H PYLORI (C-13) BREATH: CPT

## 2024-04-10 PROCEDURE — 83690 ASSAY OF LIPASE: CPT

## 2024-04-10 PROCEDURE — 36415 COLL VENOUS BLD VENIPUNCTURE: CPT

## 2024-04-10 PROCEDURE — 83036 HEMOGLOBIN GLYCOSYLATED A1C: CPT

## 2024-04-10 NOTE — PROGRESS NOTES
Patient Name: Abdiel Terrell   Visit Date: 04/11/2024   Patient ID: [id number]  Provider: PERRI Moss    Sex: male  Location:  Location Address:  Location Phone: [address]  [place] [address] [phone]    YOB: 1990  Age: 34 y.o.   Primary Care Provider Atiya Hodge APRN      Referring Provider: Wisam Hoffmann PA-C        Chief Complaint  Elevated Lipase    History of Present Illness  Patient initially presented 3/7/2022 with abdominal pain and heartburn, reported a negative CT scan and HIDA scan.  Reported H. pylori serology was positive and was treated with clarithromycin 500 twice daily, Flagyl 500 twice daily and omeprazole twice daily he had some improvement after this.  History of colonoscopy in 2018 with Dr. Kirk negative    EGD 4/18/22 appeared normal stomach biopsy-mild chronic inactive gastritis otherwise negative      Pt states he had belching and upper abd pain, on 3/31, worsened so went to ER 4/1, states was 3rd time had happened to him this year. Lipase was 476, CT was normal. Frequent bloating c/o.   No ETOH since 3/28 - 1 beer, pt states not a heavy drinker. States he has a couple drinks / beers / week.   Pt has been on Mounjaro since 1/30/24 - he's had 2 episodes since starting  Previously was on Ozempic  Pt does have loose stools, more than normal, states several times/day last few days. 2 x yesterday and 5 x the day before that  NO HB with Omeprazole   Pt states he developed pain in right groin a couple days after ER visit, he has been told hernia, has appt w Dr Hidalgo.  Past Medical History:   Diagnosis Date    Anxiety     Diabetes mellitus     type II, average  or below    GERD (gastroesophageal reflux disease)     Lower back pain        Past Surgical History:   Procedure Laterality Date    APPENDECTOMY      COLONOSCOPY      ENDOSCOPY N/A 04/18/2022    Procedure: ESOPHAGOGASTRODUODENOSCOPY WITH BIOPSIES;  Surgeon: Nadeem Kirk MD;   "Location: [facility] ENDOSCOPY;  Service: Gastroenterology;  Laterality: N/A;  NORMAL EGD    FEMUR SURGERY Right     x2    LUMBAR LAMINECTOMY Left 10/21/2022    Procedure: MINIMALLY INVASIVE LUMBAR LAMINECTOMY AND DISCECTOMY, LEFT APPROACH LUMBAR 4-LUMBAR 5;  Surgeon: Rex Reis MD;  Location: [facility] MAIN OR;  Service: Neurosurgery;  Laterality: Left;    NASAL SEPTUM SURGERY      TONSILLECTOMY      UPPER GASTROINTESTINAL ENDOSCOPY      VASECTOMY      3/11/22       Allergies   Allergen Reactions    Morphine Hives and Unknown - Low Severity    Penicillins Hives       Family History   Problem Relation Age of Onset    Cancer Mother     Cancer Father     Skin cancer Father     Liver cancer Paternal Grandfather     Cancer Son     Leukemia Son     Colon cancer Neg Hx     Malig Hyperthermia Neg Hx         Social History     Tobacco Use    Smoking status: Former     Current packs/day: 0.00     Average packs/day: 1 pack/day for 13.1 years (13.1 ttl pk-yrs)     Types: Cigarettes     Start date: 2004     Quit date: 2017     Years since quittin.1    Smokeless tobacco: Former     Types: Chew     Quit date: 2023    Tobacco comments:     Used last, Wednesday   Vaping Use    Vaping status: Never Used   Substance Use Topics    Alcohol use: Yes     Alcohol/week: 2.0 standard drinks of alcohol     Types: 2 Cans of beer per week     Comment: occ    Drug use: Never       Objective     Vital Signs:   /70 (BP Location: Left arm, Patient Position: Sitting, Cuff Size: Adult)   Pulse 86   Ht 188 cm (74\")   Wt (!) 136 kg (300 lb 3.2 oz)   BMI 38.54 kg/m²       Physical Exam  Constitutional:       General: The patient is not in acute distress.     Appearance: Normal appearance.   HENT:      Head: Normocephalic and atraumatic.      Nose: Nose normal.   Pulmonary:      Effort: Pulmonary effort is normal. No respiratory distress.   Abdominal:      General: Abdomen is flat.      Palpations: Abdomen is soft. There is no " mass.      Tenderness: There is no abdominal tenderness x  Very mild tenderness epigastric area. There is no guarding.   Musculoskeletal:      Cervical back: Neck supple.      Right lower leg: No edema.      Left lower leg: No edema.   Skin:     General: Skin is warm and dry.   Neurological:      General: No focal deficit present.      Mental Status: The patient is alert and oriented to person, place, and time.      Gait: Gait normal.   Psychiatric:         Mood and Affect: Mood normal.         Speech: Speech normal.         Behavior: Behavior normal.         Thought Content: Thought content normal.     Result Review :   The following data was reviewed by: PERRI Moss on 04/11/2024:    CBC w/diff          8/14/2023    10:27 1/8/2024    14:10 4/1/2024    10:54   CBC w/Diff   WBC 7.95  7.74  9.26    RBC 5.58  6.01  5.83    Hemoglobin 13.9  14.1  14.7    Hematocrit 43.4  45.5  47.6    MCV 77.8  75.7  81.6    MCH 24.9  23.5  25.2    MCHC 32.0  31.0  30.9    RDW 15.7  15.0  14.9    Platelets 259  334  281    Neutrophil Rel % 56.7  60.0  68.9    Immature Granulocyte Rel % 0.4  0.5  0.3    Lymphocyte Rel % 32.7  29.8  21.3    Monocyte Rel % 8.3  7.6  7.8    Eosinophil Rel % 1.4  1.2  1.4    Basophil Rel % 0.5  0.9  0.3      CMP          8/14/2023    10:27 1/8/2024    14:10 4/1/2024    10:54   CMP   Glucose 93  103  88    BUN 11  13  11    Creatinine 1.21  1.28  1.22    EGFR 80.6  75.3  79.3    Sodium 139  137  139    Potassium 4.4  5.0  4.7    Chloride 104  101  102    Calcium 9.9  10.2  9.7    Total Protein 7.3  7.7  8.0    Albumin 4.7  4.7  4.6    Globulin 2.6  3.0  3.4    Total Bilirubin 0.5  0.4  0.6    Alkaline Phosphatase 21  22  22    AST (SGOT) 18  29  18    ALT (SGPT) 17  28  17    Albumin/Globulin Ratio 1.8  1.6  1.4    BUN/Creatinine Ratio 9.1  10.2  9.0    Anion Gap 11.0  11.0  11.5        Lipase   Lipase   Date Value Ref Range Status   04/10/2024 71 (H) 13 - 60 U/L Final     Liver Workup  "  Ferritin   Date Value Ref Range Status   10/16/2019 57 30 - 300 ng/mL Final     Comment:     <10 ng/ml usually associated with Iron Deficiency Anemia.  Above normal range levels may be due to Hepatic and/or  Chronic Inflammatory Disease.       Iron   Date Value Ref Range Status   10/16/2019 55 (L) 70 - 180 ug/dL Final     TIBC   Date Value Ref Range Status   10/16/2019 336 245 - 450 ug/dL Final     Comment:     As of 10/15/03, the chemistry department began utilizing a Transferrin  assay. Data suggests that Transferrin is a better estimate of Total Iron  binding capacity than the TIBC assay. As a result the TIBC will now be a  calculated estimate from the measured Transferrin.       Iron Saturation   Date Value Ref Range Status   10/16/2019 16 (L) 20 - 55 % Final     Transferrin   Date Value Ref Range Status   10/16/2019 235.00 215.00 - 365.00 mg/dL Final     Acute HEP Panel   Hepatitis C Ab   Date Value Ref Range Status   02/15/2022 Non-Reactive Non-Reactive Final     PT/INR No results found for: \"PROTIME\", \"INR\"            Assessment and Plan    Diagnoses and all orders for this visit:    1. Other acute pancreatitis, unspecified complication status (Primary)  Comments:  Pt on Mounjaro, also has some ETOH use  Orders:  -     US Gallbladder; Future  -     Triglycerides; Future  -     IgG 4; Future  -     Calcium, Ionized; Future              Follow Up   Return in about 3 months (around 7/11/2024).  Check RUQ US  Recommend stopping ETOH  Recommend stopping Mounjaro  Check lab w/u for pancreatitis also  R/W pt pancreatitis varies and is very unpredictable. Fortunately, this episode was very mild as pancreas appeared normal on CT, but pt had symptoms c/w pancreatitis and elevated lipase. I explained that a future episode may not be as mild and symptoms can be severe even leading to death. All potential insults should be removed to prevent this as listed above.    Pt verbalized understanding.  Please fax note to " PCP    Patient was given instructions and counseling regarding his condition or for health maintenance advice. Please see specific information pulled into the AVS if appropriate.

## 2024-04-11 ENCOUNTER — OFFICE VISIT (OUTPATIENT)
Dept: GASTROENTEROLOGY | Facility: CLINIC | Age: 34
End: 2024-04-11
Payer: COMMERCIAL

## 2024-04-11 ENCOUNTER — LAB (OUTPATIENT)
Dept: LAB | Facility: HOSPITAL | Age: 34
End: 2024-04-11
Payer: COMMERCIAL

## 2024-04-11 VITALS
HEART RATE: 86 BPM | SYSTOLIC BLOOD PRESSURE: 125 MMHG | WEIGHT: 300.2 LBS | BODY MASS INDEX: 38.53 KG/M2 | DIASTOLIC BLOOD PRESSURE: 70 MMHG | HEIGHT: 74 IN

## 2024-04-11 DIAGNOSIS — K85.80 OTHER ACUTE PANCREATITIS, UNSPECIFIED COMPLICATION STATUS: Primary | ICD-10-CM

## 2024-04-11 DIAGNOSIS — K85.80 OTHER ACUTE PANCREATITIS, UNSPECIFIED COMPLICATION STATUS: ICD-10-CM

## 2024-04-11 LAB
CA-I BLD-MCNC: 5.4 MG/DL (ref 4.6–5.4)
CA-I SERPL ISE-MCNC: 1.36 MMOL/L (ref 1.15–1.35)
TRIGL SERPL-MCNC: 111 MG/DL (ref 0–150)

## 2024-04-11 PROCEDURE — 84478 ASSAY OF TRIGLYCERIDES: CPT

## 2024-04-11 PROCEDURE — 82330 ASSAY OF CALCIUM: CPT

## 2024-04-11 PROCEDURE — 82787 IGG 1 2 3 OR 4 EACH: CPT

## 2024-04-12 ENCOUNTER — TELEPHONE (OUTPATIENT)
Dept: FAMILY MEDICINE CLINIC | Facility: CLINIC | Age: 34
End: 2024-04-12

## 2024-04-12 LAB — IGG4 SER-MCNC: 45 MG/DL (ref 2–96)

## 2024-04-12 NOTE — TELEPHONE ENCOUNTER
----- Message from PERRI Reynaga sent at 4/12/2024 10:07 AM EDT -----  Call patient-improvement in lipase lab, hemoglobin A1c stable, continue current dose of Mounjaro

## 2024-04-12 NOTE — TELEPHONE ENCOUNTER
Pt states he seen [facility] 04/11/24 and was advised to stop Mounjaro as she thinks it may be the cause for pancreas issues.

## 2024-04-13 LAB — UREA BREATH TEST QL: NEGATIVE

## 2024-04-16 ENCOUNTER — OFFICE VISIT (OUTPATIENT)
Dept: SURGERY | Facility: CLINIC | Age: 34
End: 2024-04-16
Payer: COMMERCIAL

## 2024-04-16 VITALS — BODY MASS INDEX: 38.12 KG/M2 | HEIGHT: 74 IN | RESPIRATION RATE: 14 BRPM | WEIGHT: 297 LBS

## 2024-04-16 DIAGNOSIS — R10.31 RIGHT GROIN PAIN: Primary | ICD-10-CM

## 2024-04-16 DIAGNOSIS — L72.9 SCROTAL CYST: ICD-10-CM

## 2024-04-16 PROCEDURE — 99203 OFFICE O/P NEW LOW 30 MIN: CPT | Performed by: SURGERY

## 2024-04-16 NOTE — LETTER
2024     PERRI Reynaga  [address]  [address]  [place] [address]    Patient: Abdiel Terrell   YOB: 1990   Date of Visit: 2024     Dear PERRI Reynaga:       Thank you for referring Abdiel Terrell to me for evaluation. Below are the relevant portions of my assessment and plan of care.    If you have questions, please do not hesitate to call me. I look forward to following Abdiel along with you.         Sincerely,        Scott Hidalgo MD        CC: Scott Judd MD  24 [phone]  Sign when Signing Visit  Inpatient History and Physical Surgical Orders    Preadmission Location:   Preadmission Time:  Facility:  Surgery Date:  Surgery Time:  Preadmission Test date:     Chief Complaint  Outpatient History and Physical / Surgical Orders    Primary Care Provider: Atiya Hodge APRN    Referring Provider: Neftali Moreno DO Subjective     Patient Name: Abdiel Terrell : 1990    HPI  The patient is a 34-year-old gentleman who recently was in the emergency room with some right groin pain that lasted for a few days.  He had a CT scan of the abdomen and pelvis that did not show any abnormalities and specifically did not show any inguinal hernia on either side.  He also had an ultrasound of his scrotum and testes that was read out as normal.    Past History:  Medical History: has a past medical history of Anxiety, Diabetes mellitus, GERD (gastroesophageal reflux disease), and Lower back pain.   Surgical History: has a past surgical history that includes Femur Surgery (Right); Tonsillectomy; Appendectomy; Nasal septum surgery; Upper gastrointestinal endoscopy; Vasectomy; Esophagogastroduodenoscopy (N/A, 2022); Colonoscopy; and Lumbar laminectomy (Left, 10/21/2022).   Family History: family history includes Cancer in his father, mother, and son; Leukemia in his son; Liver cancer in his paternal grandfather; Skin cancer in his father.   Social  "History: reports that he quit smoking about 7 years ago. His smoking use included cigarettes. He started smoking about 20 years ago. He has a 13.1 pack-year smoking history. He quit smokeless tobacco use about 7 months ago.  His smokeless tobacco use included chew. He reports current alcohol use of about 2.0 standard drinks of alcohol per week. He reports that he does not use drugs.  Allergies: Morphine and Penicillins       Current Outpatient Medications:   •  Drysol 20 % external solution, APPLY 1 EACH TO AFFECTED AREA AT NIGHT, Disp: 35 mL, Rfl: 1  •  levocetirizine (XYZAL) 5 MG tablet, Take 1 tablet by mouth Daily., Disp: 90 tablet, Rfl: 1  •  lisinopril (PRINIVIL,ZESTRIL) 10 MG tablet, Take 1 tablet by mouth once daily, Disp: 90 tablet, Rfl: 1  •  metFORMIN ER (GLUCOPHAGE-XR) 500 MG 24 hr tablet, Take 1 tablet by mouth Daily With Breakfast., Disp: 30 tablet, Rfl: 5  •  omeprazole (priLOSEC) 20 MG capsule, Take 1 capsule by mouth Daily., Disp: 90 capsule, Rfl: 1  •  Tirzepatide (MOUNJARO) 2.5 MG/0.5ML solution pen-injector pen, Inject 0.5 mL under the skin into the appropriate area as directed 1 (One) Time Per Week., Disp: 0.5 mL, Rfl: 3       Objective  Vital Signs:   Resp 14   Ht 188 cm (74.02\")   Wt 135 kg (297 lb)   BMI 38.12 kg/m²       Physical Exam  Vitals and nursing note reviewed.   Constitutional:       Appearance: Normal appearance. The patient is well-developed.   Cardiovascular:      Rate and Rhythm: Normal rate and regular rhythm.   Pulmonary:      Effort: Pulmonary effort is normal.      Breath sounds: Normal air entry.   Abdominal:      General: Bowel sounds are normal.      Palpations: Abdomen is soft.      Skin:     General: Skin is warm and dry.   Neurological:      Mental Status: The patient is alert and oriented to person, place, and time.      Motor: Motor function is intact.   Psychiatric:         Mood and Affect: Mood normal.   Groin: I do not feel an inguinal hernia on either " side  Scrotum: He has what feels like an epididymal cyst  Result Review:               Assessment and Plan   Diagnoses and all orders for this visit:    1. Right groin pain (Primary)    2. Scrotal cyst    At this point I do not think he has a true inguinal hernia so I do not think he needs any kind of surgery for that.  He is worried about this cystic lesion at the top of his testicle and I told him we can refer him over to urology to have them take a look at this.    I  Scott Hidalgo MD  04/16/2024

## 2024-04-16 NOTE — PROGRESS NOTES
Inpatient History and Physical Surgical Orders    Preadmission Location:   Preadmission Time:  Facility:  Surgery Date:  Surgery Time:  Preadmission Test date:     Chief Complaint  Outpatient History and Physical / Surgical Orders    Primary Care Provider: Atiya Hodge APRN    Referring Provider: Neftali Moreno DO Subjective      Patient Name: Abdiel Terrell : 1990    HPI  The patient is a 34-year-old gentleman who recently was in the emergency room with some right groin pain that lasted for a few days.  He had a CT scan of the abdomen and pelvis that did not show any abnormalities and specifically did not show any inguinal hernia on either side.  He also had an ultrasound of his scrotum and testes that was read out as normal.    Past History:  Medical History: has a past medical history of Anxiety, Diabetes mellitus, GERD (gastroesophageal reflux disease), and Lower back pain.   Surgical History: has a past surgical history that includes Femur Surgery (Right); Tonsillectomy; Appendectomy; Nasal septum surgery; Upper gastrointestinal endoscopy; Vasectomy; Esophagogastroduodenoscopy (N/A, 2022); Colonoscopy; and Lumbar laminectomy (Left, 10/21/2022).   Family History: family history includes Cancer in his father, mother, and son; Leukemia in his son; Liver cancer in his paternal grandfather; Skin cancer in his father.   Social History: reports that he quit smoking about 7 years ago. His smoking use included cigarettes. He started smoking about 20 years ago. He has a 13.1 pack-year smoking history. He quit smokeless tobacco use about 7 months ago.  His smokeless tobacco use included chew. He reports current alcohol use of about 2.0 standard drinks of alcohol per week. He reports that he does not use drugs.  Allergies: Morphine and Penicillins       Current Outpatient Medications:     Drysol 20 % external solution, APPLY 1 EACH TO AFFECTED AREA AT NIGHT, Disp: 35 mL, Rfl: 1    levocetirizine  "(XYZAL) 5 MG tablet, Take 1 tablet by mouth Daily., Disp: 90 tablet, Rfl: 1    lisinopril (PRINIVIL,ZESTRIL) 10 MG tablet, Take 1 tablet by mouth once daily, Disp: 90 tablet, Rfl: 1    metFORMIN ER (GLUCOPHAGE-XR) 500 MG 24 hr tablet, Take 1 tablet by mouth Daily With Breakfast., Disp: 30 tablet, Rfl: 5    omeprazole (priLOSEC) 20 MG capsule, Take 1 capsule by mouth Daily., Disp: 90 capsule, Rfl: 1    Tirzepatide (MOUNJARO) 2.5 MG/0.5ML solution pen-injector pen, Inject 0.5 mL under the skin into the appropriate area as directed 1 (One) Time Per Week., Disp: 0.5 mL, Rfl: 3       Objective   Vital Signs:   Resp 14   Ht 188 cm (74.02\")   Wt 135 kg (297 lb)   BMI 38.12 kg/m²       Physical Exam  Vitals and nursing note reviewed.   Constitutional:       Appearance: Normal appearance. The patient is well-developed.   Cardiovascular:      Rate and Rhythm: Normal rate and regular rhythm.   Pulmonary:      Effort: Pulmonary effort is normal.      Breath sounds: Normal air entry.   Abdominal:      General: Bowel sounds are normal.      Palpations: Abdomen is soft.      Skin:     General: Skin is warm and dry.   Neurological:      Mental Status: The patient is alert and oriented to person, place, and time.      Motor: Motor function is intact.   Psychiatric:         Mood and Affect: Mood normal.   Groin: I do not feel an inguinal hernia on either side  Scrotum: He has what feels like an epididymal cyst  Result Review :               Assessment and Plan   Diagnoses and all orders for this visit:    1. Right groin pain (Primary)    2. Scrotal cyst    At this point I do not think he has a true inguinal hernia so I do not think he needs any kind of surgery for that.  He is worried about this cystic lesion at the top of his testicle and I told him we can refer him over to urology to have them take a look at this.    I  Scott Hidalgo MD  04/16/2024    "

## 2024-04-29 ENCOUNTER — TELEPHONE (OUTPATIENT)
Dept: FAMILY MEDICINE CLINIC | Facility: CLINIC | Age: 34
End: 2024-04-29

## 2024-04-29 ENCOUNTER — TELEPHONE (OUTPATIENT)
Dept: GASTROENTEROLOGY | Facility: CLINIC | Age: 34
End: 2024-04-29
Payer: COMMERCIAL

## 2024-04-29 NOTE — TELEPHONE ENCOUNTER
Caller: Abdiel Terrell    Relationship to patient: Self    Best call back number: [phone]     Patient is needing: PATIENT STATES THAT HE DROPPED OFF RETURN TO WORK WITH NO RESTRICTIONS PAPERWORK ON 04.25.2024 FOR [employer] TO FILL OUT. PATIENT CALLING FOR A STATUS UPDATE ON PAPERWORK.

## 2024-05-09 ENCOUNTER — HOSPITAL ENCOUNTER (OUTPATIENT)
Dept: NUCLEAR MEDICINE | Facility: HOSPITAL | Age: 34
Discharge: HOME | End: 2024-05-09
Payer: COMMERCIAL

## 2024-05-09 ENCOUNTER — HOSPITAL ENCOUNTER (OUTPATIENT)
Dept: ULTRASOUND IMAGING | Facility: HOSPITAL | Age: 34
Discharge: HOME | End: 2024-05-09
Admitting: NURSE PRACTITIONER
Payer: COMMERCIAL

## 2024-05-09 DIAGNOSIS — R10.84 GENERALIZED ABDOMINAL PAIN: ICD-10-CM

## 2024-05-09 DIAGNOSIS — K85.80 OTHER ACUTE PANCREATITIS, UNSPECIFIED COMPLICATION STATUS: ICD-10-CM

## 2024-05-09 PROCEDURE — 76705 ECHO EXAM OF ABDOMEN: CPT

## 2024-05-09 PROCEDURE — A9502 TC99M TETROFOSMIN: HCPCS | Performed by: NURSE PRACTITIONER

## 2024-05-09 PROCEDURE — 0 TECHNETIUM TETROFOSMIN KIT: Performed by: NURSE PRACTITIONER

## 2024-05-09 PROCEDURE — 78227 HEPATOBIL SYST IMAGE W/DRUG: CPT

## 2024-05-09 RX ADMIN — TETROFOSMIN 1 DOSE: 1.38 INJECTION, POWDER, LYOPHILIZED, FOR SOLUTION INTRAVENOUS at 08:30

## 2024-05-10 DIAGNOSIS — E11.9 TYPE 2 DIABETES MELLITUS WITHOUT COMPLICATION, WITHOUT LONG-TERM CURRENT USE OF INSULIN: ICD-10-CM

## 2024-05-10 DIAGNOSIS — E66.01 MORBID (SEVERE) OBESITY DUE TO EXCESS CALORIES: ICD-10-CM

## 2024-05-13 RX ORDER — TIRZEPATIDE 2.5 MG/.5ML
INJECTION, SOLUTION SUBCUTANEOUS
Qty: 4 ML | Refills: 0 | OUTPATIENT
Start: 2024-05-13

## 2024-05-14 DIAGNOSIS — E83.52 SERUM CALCIUM ELEVATED: ICD-10-CM

## 2024-05-14 DIAGNOSIS — K85.80 OTHER ACUTE PANCREATITIS, UNSPECIFIED COMPLICATION STATUS: Primary | ICD-10-CM

## 2024-05-15 ENCOUNTER — PREP FOR SURGERY (OUTPATIENT)
Dept: OTHER | Facility: HOSPITAL | Age: 34
End: 2024-05-15
Payer: COMMERCIAL

## 2024-05-15 ENCOUNTER — OFFICE VISIT (OUTPATIENT)
Dept: UROLOGY | Facility: CLINIC | Age: 34
End: 2024-05-15
Payer: COMMERCIAL

## 2024-05-15 VITALS
HEIGHT: 74 IN | BODY MASS INDEX: 38.5 KG/M2 | DIASTOLIC BLOOD PRESSURE: 72 MMHG | SYSTOLIC BLOOD PRESSURE: 132 MMHG | WEIGHT: 300 LBS

## 2024-05-15 DIAGNOSIS — N43.40 SPERMATOCELE: Primary | ICD-10-CM

## 2024-05-15 LAB
BILIRUB BLD-MCNC: NEGATIVE MG/DL
CLARITY, POC: CLEAR
COLOR UR: YELLOW
EXPIRATION DATE: NORMAL
GLUCOSE UR STRIP-MCNC: NEGATIVE MG/DL
KETONES UR QL: NEGATIVE
LEUKOCYTE EST, POC: NEGATIVE
Lab: NORMAL
NITRITE UR-MCNC: NEGATIVE MG/ML
PH UR: 6.5 [PH] (ref 5–8)
PROT UR STRIP-MCNC: NEGATIVE MG/DL
RBC # UR STRIP: NEGATIVE /UL
SP GR UR: 1.02 (ref 1–1.03)
UROBILINOGEN UR QL: NORMAL

## 2024-05-15 RX ORDER — SODIUM CHLORIDE 0.9 % (FLUSH) 0.9 %
10 SYRINGE (ML) INJECTION AS NEEDED
OUTPATIENT
Start: 2024-05-15

## 2024-05-15 RX ORDER — SODIUM CHLORIDE 9 MG/ML
40 INJECTION, SOLUTION INTRAVENOUS AS NEEDED
OUTPATIENT
Start: 2024-05-15

## 2024-05-15 RX ORDER — SODIUM CHLORIDE 9 MG/ML
100 INJECTION, SOLUTION INTRAVENOUS CONTINUOUS
OUTPATIENT
Start: 2024-05-15

## 2024-05-15 RX ORDER — SODIUM CHLORIDE 0.9 % (FLUSH) 0.9 %
10 SYRINGE (ML) INJECTION EVERY 12 HOURS SCHEDULED
OUTPATIENT
Start: 2024-05-15

## 2024-05-15 NOTE — PROGRESS NOTES
"Chief Complaint  Scrotal cyst    Subjective          Abdiel Rico Terrell presents to [facility] UROLOGY  History of Present Illness  Patient was seen in the ER for groin pain after lifting.  He was originally sent to Dr. Hidalgo for possible hernia but Dr. Hidalgo thought that he probably had a scrotal cyst and referred him to me.  Scrotal ultrasound was normal.  On exam he does have a spermatocele over area of previous vasectomy.      Objective   Vital Signs:   /72   Ht 188 cm (74.02\")   Wt 136 kg (300 lb)   BMI 38.50 kg/m²       Physical Exam  Genitourinary:     Comments: Right spermatocele over area of right vasectomy         Result Review :   The following data was reviewed by: Lanie Montes De Oca MD on 05/15/2024:    Results for orders placed or performed in visit on 05/15/24   POC Urinalysis Dipstick, Automated    Specimen: Urine   Result Value Ref Range    Color Yellow Yellow, Straw, Dark Yellow, Adela    Clarity, UA Clear Clear    Specific Gravity  1.020 1.005 - 1.030    pH, Urine 6.5 5.0 - 8.0    Leukocytes Negative Negative    Nitrite, UA Negative Negative    Protein, POC Negative Negative mg/dL    Glucose, UA Negative Negative mg/dL    Ketones, UA Negative Negative    Urobilinogen, UA 0.2 E.U./dL Normal, 0.2 E.U./dL    Bilirubin Negative Negative    Blood, UA Negative Negative    Lot Number [id number]     Expiration Date 32,025           Study Result    Narrative & Impression   SCROTAL ULTRASOUND, INCLUDING DOPPLER ULTRASOUND VASCULAR EVALUATION     HISTORY:  Testicular pain for 2 days.     TECHNIQUE:  High-resolution grayscale ultrasound imaging of the scrotal contents was  performed. Vascular evaluation was performed using gray scale, spectral  Doppler and color flow Doppler ultrasound imaging.     COMPARISON:  10/10/2021     FINDINGS:  Both testes are normal in size and ultrasound appearance. No mass or  other focal testicular lesion is identified. The right testicle measures  5.2 " cm. The left testicle measures 5.2 cm as well.     The intrascrotal extratesticular contents are also normal in appearance.  No epididymis enlargement, scrotal mass or significant fluid collection  is demonstrated.     Vascular evaluation shows normal, bilaterally symmetric blood flow to  each testis and each epididymis. There is no evidence of testicular  torsion, acute epididymitis or acute orchitis.     IMPRESSION:  Normal Scrotal Ultrasound     Electronically Signed By-Dr. Randy Rutherford MD On:4/4/2024 12:26 AM            Assessment and Plan    Diagnoses and all orders for this visit:    1. Spermatocele (Primary)  -     POC Urinalysis Dipstick, Automated    Will proceed with a spermatocelectomy.  Risk and benefits discussed and he is agreeable to proceed.        Follow Up       No follow-ups on file.  Patient was given instructions and counseling regarding his condition or for health maintenance advice. Please see specific information pulled into the AVS if appropriate.

## 2024-05-15 NOTE — H&P
[facility]   Urology HISTORY AND PHYSICAL    Patient Name: Abdiel Terrell  : 1990  MRN: [id number]  Primary Care Physician:  Atiya Hodge APRN  Date of admission: (Not on file)    Subjective   Subjective       History of Present Illness  Patient has a spermatocele or sperm granuloma after vasectomy and presents for spermatocelectomy.      Personal History     Past Medical History:   Diagnosis Date    Anxiety     Diabetes mellitus     type II, average  or below    GERD (gastroesophageal reflux disease)     Lower back pain        Past Surgical History:   Procedure Laterality Date    APPENDECTOMY      COLONOSCOPY      ENDOSCOPY N/A 2022    Procedure: ESOPHAGOGASTRODUODENOSCOPY WITH BIOPSIES;  Surgeon: Nadeem Kirk MD;  Location: [facility] ENDOSCOPY;  Service: Gastroenterology;  Laterality: N/A;  NORMAL EGD    FEMUR SURGERY Right     x2    LUMBAR LAMINECTOMY Left 10/21/2022    Procedure: MINIMALLY INVASIVE LUMBAR LAMINECTOMY AND DISCECTOMY, LEFT APPROACH LUMBAR 4-LUMBAR 5;  Surgeon: Rex Reis MD;  Location: [facility] MAIN OR;  Service: Neurosurgery;  Laterality: Left;    NASAL SEPTUM SURGERY      TONSILLECTOMY      UPPER GASTROINTESTINAL ENDOSCOPY      VASECTOMY      3/11/22       Family History: family history includes Cancer in his father, mother, and son; Leukemia in his son; Liver cancer in his paternal grandfather; Skin cancer in his father. Otherwise pertinent FHx was reviewed and not pertinent to current issue.    Social History:  reports that he quit smoking about 7 years ago. His smoking use included cigarettes. He started smoking about 20 years ago. He has a 13.1 pack-year smoking history. He quit smokeless tobacco use about 8 months ago.  His smokeless tobacco use included chew. He reports current alcohol use of about 2.0 standard drinks of alcohol per week. He reports that he does not use drugs.    Home Medications:  Tirzepatide, aluminum chloride, levocetirizine,  lisinopril, metFORMIN ER, and omeprazole    Allergies:  Allergies   Allergen Reactions    Morphine Hives and Unknown - Low Severity    Penicillins Hives       Objective    Objective     Vitals:   BP: (132)/(72) 132/72    Physical Exam  Constitutional:       Appearance: Normal appearance.   Cardiovascular:      Rate and Rhythm: Normal rate and regular rhythm.   Pulmonary:      Effort: Pulmonary effort is normal.      Breath sounds: Normal breath sounds.   Neurological:      Mental Status: He is alert. Mental status is at baseline.   Psychiatric:         Mood and Affect: Mood and affect normal.         Speech: Speech normal.         Judgment: Judgment normal.         Result Review    Result Review:  I have personally reviewed the results from the time of this admission to 5/15/2024 14:49 EDT and agree with these findings:  []  Laboratory  []  Microbiology  [x]  Radiology  []  EKG/Telemetry   []  Cardiology/Vascular   []  Pathology  [x]  Old records  []  Other:      Assessment & Plan   Assessment / Plan       Active Hospital Problems:  There are no active hospital problems to display for this patient.      Plan: Right spermatocelectomy  Risks and benefits discussed with patient and they are agreeable to proceed.    DVT prophylaxis:  No DVT prophylaxis order currently exists.        CODE STATUS:           Electronically signed by Lanie Montes De Oca MD, 05/15/24, 2:49 PM EDT.

## 2024-06-02 DIAGNOSIS — R61 HYPERHIDROSIS: ICD-10-CM

## 2024-06-03 RX ORDER — ALUMINUM CHLORIDE 20 %
SOLUTION, NON-ORAL TOPICAL
Qty: 35 ML | Refills: 0 | Status: SHIPPED | OUTPATIENT
Start: 2024-06-03

## 2024-06-07 ENCOUNTER — TELEPHONE (OUTPATIENT)
Dept: UROLOGY | Facility: CLINIC | Age: 34
End: 2024-06-07
Payer: COMMERCIAL

## 2024-06-07 NOTE — TELEPHONE ENCOUNTER
Left message informing patient that Dr. Montes De Oca has to move surgery to different day. I asked for a call back to let us know which day-either 7/11, 7/16 or 7/18.

## 2024-06-10 DIAGNOSIS — E11.9 TYPE 2 DIABETES MELLITUS WITHOUT COMPLICATION, WITHOUT LONG-TERM CURRENT USE OF INSULIN: ICD-10-CM

## 2024-06-11 RX ORDER — METFORMIN HYDROCHLORIDE 500 MG/1
500 TABLET, EXTENDED RELEASE ORAL
Qty: 90 TABLET | Refills: 1 | Status: SHIPPED | OUTPATIENT
Start: 2024-06-11

## 2024-06-14 ENCOUNTER — HOSPITAL ENCOUNTER (EMERGENCY)
Facility: HOSPITAL | Age: 34
Discharge: HOME | End: 2024-06-14
Attending: EMERGENCY MEDICINE
Payer: COMMERCIAL

## 2024-06-14 ENCOUNTER — APPOINTMENT (OUTPATIENT)
Dept: GENERAL RADIOLOGY | Facility: HOSPITAL | Age: 34
End: 2024-06-14
Payer: COMMERCIAL

## 2024-06-14 VITALS
BODY MASS INDEX: 39.21 KG/M2 | DIASTOLIC BLOOD PRESSURE: 82 MMHG | HEIGHT: 74 IN | SYSTOLIC BLOOD PRESSURE: 118 MMHG | OXYGEN SATURATION: 100 % | WEIGHT: 305.56 LBS | HEART RATE: 111 BPM | RESPIRATION RATE: 20 BRPM | TEMPERATURE: 99.5 F

## 2024-06-14 DIAGNOSIS — S40.011A CONTUSION OF MULTIPLE SITES OF RIGHT SHOULDER, INITIAL ENCOUNTER: ICD-10-CM

## 2024-06-14 DIAGNOSIS — S70.02XA CONTUSION OF LEFT HIP, INITIAL ENCOUNTER: ICD-10-CM

## 2024-06-14 DIAGNOSIS — S93.602A FOOT SPRAIN, LEFT, INITIAL ENCOUNTER: ICD-10-CM
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
## 2024-06-14 LAB
HOLD SPECIMEN: NORMAL
HOLD SPECIMEN: NORMAL
WHOLE BLOOD HOLD COAG: NORMAL
WHOLE BLOOD HOLD SPECIMEN: NORMAL

## 2024-06-14 PROCEDURE — 73030 X-RAY EXAM OF SHOULDER: CPT

## 2024-06-14 PROCEDURE — 99283 EMERGENCY DEPT VISIT LOW MDM: CPT

## 2024-06-14 PROCEDURE — 73630 X-RAY EXAM OF FOOT: CPT

## 2024-06-14 PROCEDURE — 25010000002 KETOROLAC TROMETHAMINE PER 15 MG: Performed by: EMERGENCY MEDICINE

## 2024-06-14 PROCEDURE — 73502 X-RAY EXAM HIP UNI 2-3 VIEWS: CPT

## 2024-06-14 PROCEDURE — 96374 THER/PROPH/DIAG INJ IV PUSH: CPT

## 2024-06-14 RX ORDER — SODIUM CHLORIDE 0.9 % (FLUSH) 0.9 %
10 SYRINGE (ML) INJECTION AS NEEDED
Status: DISCONTINUED | OUTPATIENT
Start: 2024-06-14 | End: 2024-06-15 | Stop reason: HOSPADM

## 2024-06-14 RX ORDER — HYDROCODONE BITARTRATE AND ACETAMINOPHEN 5; 325 MG/1; MG/1
1 TABLET ORAL EVERY 6 HOURS PRN
Status: DISCONTINUED | OUTPATIENT
Start: 2024-06-14 | End: 2024-06-15 | Stop reason: HOSPADM

## 2024-06-14 RX ORDER — KETOROLAC TROMETHAMINE 30 MG/ML
30 INJECTION, SOLUTION INTRAMUSCULAR; INTRAVENOUS ONCE
Status: COMPLETED | OUTPATIENT
Start: 2024-06-14 | End: 2024-06-14

## 2024-06-14 RX ORDER — CYCLOBENZAPRINE HCL 10 MG
10 TABLET ORAL 3 TIMES DAILY PRN
Qty: 9 TABLET | Refills: 0 | Status: SHIPPED | OUTPATIENT
Start: 2024-06-14 | End: 2024-06-18

## 2024-06-14 RX ORDER — NAPROXEN 500 MG/1
500 TABLET ORAL 2 TIMES DAILY WITH MEALS
Qty: 14 TABLET | Refills: 0 | Status: SHIPPED | OUTPATIENT
Start: 2024-06-14

## 2024-06-14 RX ADMIN — KETOROLAC TROMETHAMINE 30 MG: 30 INJECTION, SOLUTION INTRAMUSCULAR; INTRAVENOUS at 22:19

## 2024-06-14 NOTE — Clinical Note
[facility] EMERGENCY ROOM  [address] [place] [address]  Phone: [phone]  Fax: [phone]    Abdiel Terrell was seen and treated in our emergency department on 6/14/2024.  He may return to work on 06/19/2024.         Thank you for choosing [facility].    Keyanna Bay RN
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
## 2024-06-18 ENCOUNTER — OFFICE VISIT (OUTPATIENT)
Dept: FAMILY MEDICINE CLINIC | Facility: CLINIC | Age: 34
End: 2024-06-18
Payer: COMMERCIAL

## 2024-06-18 VITALS
HEART RATE: 69 BPM | OXYGEN SATURATION: 97 % | WEIGHT: 306.2 LBS | BODY MASS INDEX: 39.3 KG/M2 | HEIGHT: 74 IN | DIASTOLIC BLOOD PRESSURE: 64 MMHG | SYSTOLIC BLOOD PRESSURE: 109 MMHG | TEMPERATURE: 98.3 F

## 2024-06-18 DIAGNOSIS — S89.90XA: ICD-10-CM
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
## 2024-06-18 DIAGNOSIS — S49.91XA INJURY OF RIGHT UPPER EXTREMITY, INITIAL ENCOUNTER: ICD-10-CM

## 2024-06-18 DIAGNOSIS — Z09 FOLLOW-UP EXAM AFTER TREATMENT: Primary | ICD-10-CM

## 2024-06-18 DIAGNOSIS — M25.552 LEFT HIP PAIN: ICD-10-CM

## 2024-06-18 PROCEDURE — 99213 OFFICE O/P EST LOW 20 MIN: CPT | Performed by: NURSE PRACTITIONER
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
## 2024-07-02 DIAGNOSIS — K21.9 GASTROESOPHAGEAL REFLUX DISEASE WITHOUT ESOPHAGITIS: ICD-10-CM

## 2024-07-02 DIAGNOSIS — J30.1 ALLERGIC RHINITIS DUE TO POLLEN, UNSPECIFIED SEASONALITY: ICD-10-CM

## 2024-07-02 RX ORDER — LEVOCETIRIZINE DIHYDROCHLORIDE 5 MG/1
5 TABLET, FILM COATED ORAL DAILY
Qty: 90 TABLET | Refills: 0 | Status: SHIPPED | OUTPATIENT
Start: 2024-07-02

## 2024-07-02 RX ORDER — OMEPRAZOLE 20 MG/1
20 CAPSULE, DELAYED RELEASE ORAL DAILY
Qty: 90 CAPSULE | Refills: 0 | Status: SHIPPED | OUTPATIENT
Start: 2024-07-02

## 2024-07-03 DIAGNOSIS — R61 HYPERHIDROSIS: ICD-10-CM

## 2024-07-03 RX ORDER — ALUMINUM CHLORIDE 20 %
SOLUTION, NON-ORAL TOPICAL
Qty: 35 ML | Refills: 0 | Status: SHIPPED | OUTPATIENT
Start: 2024-07-03

## 2024-07-03 NOTE — TELEPHONE ENCOUNTER
LRF 06/03/2024  LOV 06/18/2024  F/U none on file    SW/CM Discharge Plan  Informed patient is ready for discharge. Patient’s discharge destination is [facility]. Patient to be picked up by [facility] [phone].     After Visit Summary - Transition Report Information  Receiving Agency/Facility: [facility]  Receiving Agency/Facility phone number: [phone]  Receiving Agency/Facility address: [address]  Receiving Agency/Facility city/state: [place] [address]  Receiving Agency/Facility Type: Skilled Nursing Facility  Receiving Agency/Facility Comment: Pt can transfer to [facility]. He will go to [facility] Dr. Anthony Snell will be following doctor and his phone number is [phone]. Number for report is [phone].      A BLS is on will call for this pt today. You can call [facility] at [phone] to confirm  time. PCS form has already been received.

## 2024-07-05 NOTE — PRE-PROCEDURE INSTRUCTIONS
PATIENT INSTRUCTED TO BE:    - NOTHING TO EAT AFTER MIDNIGHT OR CHEW, EXCEPT CAN HAVE CLEAR LIQUIDS 2 HOURS PRIOR TO SURGERY ARRIVAL TIME , NO MORE THAN 8 OZ. (NOTHING RED)     - TO HOLD ALL VITAMINS, SUPPLEMENTS, NSAIDS FOR ONE WEEK PRIOR TO THEIR SURGICAL PROCEDURE ( STOP NAPROXEN)    - DO NOT TAKE ____N/A__________________ 7 DAYS PRIOR TO PROCEDURE PER ANESTHESIA RECOMMENDATIONS/INSTRUCTIONS     - INSTRUCTED PT TO USE SURGICAL SOAP 1 TIME THE NIGHT PRIOR TO SURGERY ___7-10-24________ OR THE AM OF SURGERY ___7-56-78__________( FOLLOW DR AMBROCIO'S OFFICE INSTRUCTIONS ON USE OF CHG)   USE THE SOAP FROM NECK TO TOES, AVOID THEIR FACE, HAIR, AND PRIVATE PARTS. IF USE THE SOAP THE NIGHT PRIOR TO SURGERY, CHANGE BED LINENS AND NO PETS IN THE BED.     INSTRUCTED NO LOTIONS, JEWELRY, PIERCINGS,  NAIL POLISH, OR DEODORANT DAY OF SURGERY    - IF DIABETIC, CHECK BLOOD GLUCOSE IF LESS THAN 70 OR HAVING SYMPTOMS CALL THE [facility] AREA FOR INSTRUCTIONS ON AM OF SURGERY ([phone] )    -INSTRUCTED TO TAKE THE FOLLOWING MEDICATIONS THE DAY OF SURGERY WITH SIPS OF WATER:             XYZAL, PRILOSEC    INSTRUCTED PT TO METFORMIN BY 6PM THE NIGHT PRIOR TO SURGERY AND DO NOT TAKE DAY OF SURGERY    - DO NOT BRING ANY MEDICATIONS WITH YOU TO THE HOSPITAL THE DAY OF SURGERY, EXCEPT IF USE INHALERS. BRING INHALERS DAY OF SURGERY       - BRING CPAP OR BIPAP TO THE HOSPITAL ONLY IF YOU ARE SPENDING THE NIGHT    - DO NOT SMOKE OR VAPE 24 HOURS PRIOR TO PROCEDURE PER ANESTHESIA REQUEST     -MAKE SURE YOU HAVE A RIDE HOME OR SOMEONE TO STAY WITH YOU THE DAY OF THE PROCEDURE AFTER YOU GO HOME     - FOLLOW ANY OTHER INSTRUCTIONS GIVEN TO YOU BY YOUR SURGEON'S OFFICE.     - DAY OF SURGERY _7-11-24__, COME TO [facility] A, THIRD FLOOR, CHECK IN AT THE DESK FOR REGISTRATION/SURGERY     - YOU WILL RECEIVE A PHONE CALL THE DAY PRIOR TO SURGERY BETWEEN 1PM AND 4 PM WITH ARRIVAL TIME, IF YOUR SURGERY IS ON A MONDAY YOU WILL RECEIVE A CALL THE FRIDAY  PRIOR TO SURGERY DATE    - BRING CASH OR CREDIT CARD FOR COPAYMENT OF MEDICATIONS AFTER SURGERY IF YOU USE THE HOSPITAL PHARMACY (MEDS TO BED)    - PREADMISSION TESTING NURSE JAILYN COLE -[phone] IF HAVE ANY QUESTIONS     -PATIENT PROVIDED THE NUMBER FOR [facility] SURGICAL DEPT IF HAD QUESTIONS AFTER HOURS PRIOR TO SURGERY ([phone] ).  INFORMED PT IF NO ANSWER, LEAVE A MESSAGE AND SOMEONE WILL RETURN THEIR CALL       PATIENT VERBALIZED UNDERSTANDING       Clear Liquid Diet        Find out when you need to start a clear liquid diet.   Think of “clear liquids” as anything you could read a newspaper through. This includes things like water, broth, sports drinks, or tea WITHOUT any kind of milk or cream.           Once you are told to start a clear liquid diet, only drink these things until 2 hours before arrival to the hospital or when the hospital says to stop. Total volume limitation: 8 oz.       Clear liquids you CAN drink:   Water   Clear broth: beef, chicken, vegetable, or bone broth with nothing in it   Gatorade   Lemonade or Macario-aid   Soda   Tea, coffee (NO cream or honey)   Jell-O (without fruit)   Popsicles (without fruit or cream)   Italian ices   Juice without pulp: apple, white, grape   You may use salt, pepper, and sugar  NO RED  NO NOODLES    Do NOT drink:   Milk or cream   Soy milk, almond milk, coconut milk, or other non-dairy drinks and   creamers   Milkshakes or smoothies   Tomato juice   Orange juice   Grapefruit juice   Cream soups or any other than broth         Clear Liquid Diet:  Do NOT eat any solid food.  Do NOT eat or suck on mints or candy.  Do NOT chew gum.  Do NOT drink thick liquids like milk or juice with pulp in it.  Do NOT add milk, cream, or anything like soy milk or almond milk to coffee or tea.

## 2024-07-11 ENCOUNTER — ANESTHESIA (OUTPATIENT)
Dept: PERIOP | Facility: HOSPITAL | Age: 34
End: 2024-07-11
Payer: COMMERCIAL

## 2024-07-11 ENCOUNTER — ANESTHESIA EVENT (OUTPATIENT)
Dept: PERIOP | Facility: HOSPITAL | Age: 34
End: 2024-07-11
Payer: COMMERCIAL

## 2024-07-11 ENCOUNTER — HOSPITAL ENCOUNTER (OUTPATIENT)
Facility: HOSPITAL | Age: 34
Setting detail: HOSPITAL OUTPATIENT SURGERY
Discharge: HOME | End: 2024-07-11
Attending: UROLOGY | Admitting: UROLOGY
Payer: COMMERCIAL

## 2024-07-11 VITALS
DIASTOLIC BLOOD PRESSURE: 64 MMHG | OXYGEN SATURATION: 98 % | RESPIRATION RATE: 16 BRPM | TEMPERATURE: 97.6 F | HEIGHT: 74 IN | WEIGHT: 302.47 LBS | BODY MASS INDEX: 38.82 KG/M2 | HEART RATE: 70 BPM | SYSTOLIC BLOOD PRESSURE: 109 MMHG

## 2024-07-11 DIAGNOSIS — N43.40 SPERMATOCELE: ICD-10-CM

## 2024-07-11 LAB
ANION GAP SERPL CALCULATED.3IONS-SCNC: 11.3 MMOL/L (ref 5–15)
BUN SERPL-MCNC: 15 MG/DL (ref 6–20)
BUN/CREAT SERPL: 12.5 (ref 7–25)
CALCIUM SPEC-SCNC: 9.6 MG/DL (ref 8.6–10.5)
CHLORIDE SERPL-SCNC: 105 MMOL/L (ref 98–107)
CO2 SERPL-SCNC: 21.7 MMOL/L (ref 22–29)
CREAT SERPL-MCNC: 1.2 MG/DL (ref 0.76–1.27)
EGFRCR SERPLBLD CKD-EPI 2021: 80.9 ML/MIN/1.73
GLUCOSE BLDC GLUCOMTR-MCNC: 115 MG/DL (ref 70–99)
GLUCOSE SERPL-MCNC: 107 MG/DL (ref 65–99)
POTASSIUM SERPL-SCNC: 4.8 MMOL/L (ref 3.5–5.2)
SODIUM SERPL-SCNC: 138 MMOL/L (ref 136–145)

## 2024-07-11 PROCEDURE — S0260 H&P FOR SURGERY: HCPCS | Performed by: UROLOGY

## 2024-07-11 PROCEDURE — 25010000002 FENTANYL CITRATE (PF) 50 MCG/ML SOLUTION: Performed by: NURSE ANESTHETIST, CERTIFIED REGISTERED

## 2024-07-11 PROCEDURE — 25010000002 DEXAMETHASONE PER 1 MG: Performed by: NURSE ANESTHETIST, CERTIFIED REGISTERED

## 2024-07-11 PROCEDURE — 82948 REAGENT STRIP/BLOOD GLUCOSE: CPT

## 2024-07-11 PROCEDURE — 54840 REMOVE EPIDIDYMIS LESION: CPT | Performed by: UROLOGY

## 2024-07-11 PROCEDURE — 80048 BASIC METABOLIC PNL TOTAL CA: CPT | Performed by: ANESTHESIOLOGY

## 2024-07-11 PROCEDURE — 25010000002 MIDAZOLAM PER 1MG: Performed by: ANESTHESIOLOGY

## 2024-07-11 PROCEDURE — 25810000003 LACTATED RINGERS PER 1000 ML: Performed by: ANESTHESIOLOGY

## 2024-07-11 PROCEDURE — 25010000002 CEFAZOLIN PER 500 MG: Performed by: UROLOGY

## 2024-07-11 PROCEDURE — 25010000002 BUPIVACAINE (PF) 0.5 % SOLUTION: Performed by: UROLOGY

## 2024-07-11 PROCEDURE — 25010000002 METOCLOPRAMIDE PER 10 MG: Performed by: ANESTHESIOLOGY

## 2024-07-11 PROCEDURE — 88304 TISSUE EXAM BY PATHOLOGIST: CPT | Performed by: UROLOGY

## 2024-07-11 PROCEDURE — 25010000002 ONDANSETRON PER 1 MG: Performed by: NURSE ANESTHETIST, CERTIFIED REGISTERED

## 2024-07-11 PROCEDURE — 25010000002 PROPOFOL 10 MG/ML EMULSION: Performed by: NURSE ANESTHETIST, CERTIFIED REGISTERED

## 2024-07-11 RX ORDER — SODIUM CHLORIDE 9 MG/ML
100 INJECTION, SOLUTION INTRAVENOUS CONTINUOUS
Status: DISCONTINUED | OUTPATIENT
Start: 2024-07-11 | End: 2024-07-11 | Stop reason: HOSPADM

## 2024-07-11 RX ORDER — ACETAMINOPHEN 500 MG
1000 TABLET ORAL ONCE
Status: COMPLETED | OUTPATIENT
Start: 2024-07-11 | End: 2024-07-11

## 2024-07-11 RX ORDER — METOCLOPRAMIDE HYDROCHLORIDE 5 MG/ML
10 INJECTION INTRAMUSCULAR; INTRAVENOUS
Status: COMPLETED | OUTPATIENT
Start: 2024-07-11 | End: 2024-07-11

## 2024-07-11 RX ORDER — SODIUM CHLORIDE 9 MG/ML
40 INJECTION, SOLUTION INTRAVENOUS AS NEEDED
Status: DISCONTINUED | OUTPATIENT
Start: 2024-07-11 | End: 2024-07-11 | Stop reason: HOSPADM

## 2024-07-11 RX ORDER — ONDANSETRON 2 MG/ML
INJECTION INTRAMUSCULAR; INTRAVENOUS AS NEEDED
Status: DISCONTINUED | OUTPATIENT
Start: 2024-07-11 | End: 2024-07-11 | Stop reason: SURG

## 2024-07-11 RX ORDER — SODIUM CHLORIDE 0.9 % (FLUSH) 0.9 %
10 SYRINGE (ML) INJECTION EVERY 12 HOURS SCHEDULED
Status: DISCONTINUED | OUTPATIENT
Start: 2024-07-11 | End: 2024-07-11 | Stop reason: HOSPADM

## 2024-07-11 RX ORDER — OXYCODONE HYDROCHLORIDE 5 MG/1
5 TABLET ORAL
Status: DISCONTINUED | OUTPATIENT
Start: 2024-07-11 | End: 2024-07-11 | Stop reason: HOSPADM

## 2024-07-11 RX ORDER — MEPERIDINE HYDROCHLORIDE 25 MG/ML
12.5 INJECTION INTRAMUSCULAR; INTRAVENOUS; SUBCUTANEOUS
Status: DISCONTINUED | OUTPATIENT
Start: 2024-07-11 | End: 2024-07-11 | Stop reason: HOSPADM

## 2024-07-11 RX ORDER — SODIUM CHLORIDE, SODIUM LACTATE, POTASSIUM CHLORIDE, CALCIUM CHLORIDE 600; 310; 30; 20 MG/100ML; MG/100ML; MG/100ML; MG/100ML
9 INJECTION, SOLUTION INTRAVENOUS CONTINUOUS PRN
Status: DISCONTINUED | OUTPATIENT
Start: 2024-07-11 | End: 2024-07-11 | Stop reason: HOSPADM

## 2024-07-11 RX ORDER — METOCLOPRAMIDE HYDROCHLORIDE 5 MG/ML
10 INJECTION INTRAMUSCULAR; INTRAVENOUS
Status: DISCONTINUED | OUTPATIENT
Start: 2024-07-12 | End: 2024-07-11

## 2024-07-11 RX ORDER — IBUPROFEN 600 MG/1
600 TABLET ORAL EVERY 6 HOURS PRN
Status: DISCONTINUED | OUTPATIENT
Start: 2024-07-11 | End: 2024-07-11 | Stop reason: HOSPADM

## 2024-07-11 RX ORDER — BUPIVACAINE HYDROCHLORIDE 5 MG/ML
INJECTION, SOLUTION EPIDURAL; INTRACAUDAL AS NEEDED
Status: DISCONTINUED | OUTPATIENT
Start: 2024-07-11 | End: 2024-07-11 | Stop reason: HOSPADM

## 2024-07-11 RX ORDER — GINSENG 100 MG
CAPSULE ORAL AS NEEDED
Status: DISCONTINUED | OUTPATIENT
Start: 2024-07-11 | End: 2024-07-11 | Stop reason: HOSPADM

## 2024-07-11 RX ORDER — DEXMEDETOMIDINE HYDROCHLORIDE 100 UG/ML
INJECTION, SOLUTION INTRAVENOUS AS NEEDED
Status: DISCONTINUED | OUTPATIENT
Start: 2024-07-11 | End: 2024-07-11 | Stop reason: SURG

## 2024-07-11 RX ORDER — DEXAMETHASONE SODIUM PHOSPHATE 4 MG/ML
INJECTION, SOLUTION INTRA-ARTICULAR; INTRALESIONAL; INTRAMUSCULAR; INTRAVENOUS; SOFT TISSUE AS NEEDED
Status: DISCONTINUED | OUTPATIENT
Start: 2024-07-11 | End: 2024-07-11 | Stop reason: SURG

## 2024-07-11 RX ORDER — PROMETHAZINE HYDROCHLORIDE 12.5 MG/1
25 TABLET ORAL ONCE AS NEEDED
Status: DISCONTINUED | OUTPATIENT
Start: 2024-07-11 | End: 2024-07-11 | Stop reason: HOSPADM

## 2024-07-11 RX ORDER — SODIUM CHLORIDE 0.9 % (FLUSH) 0.9 %
10 SYRINGE (ML) INJECTION AS NEEDED
Status: DISCONTINUED | OUTPATIENT
Start: 2024-07-11 | End: 2024-07-11 | Stop reason: HOSPADM

## 2024-07-11 RX ORDER — MAGNESIUM HYDROXIDE 1200 MG/15ML
LIQUID ORAL AS NEEDED
Status: DISCONTINUED | OUTPATIENT
Start: 2024-07-11 | End: 2024-07-11 | Stop reason: HOSPADM

## 2024-07-11 RX ORDER — MIDAZOLAM HYDROCHLORIDE 2 MG/2ML
2 INJECTION, SOLUTION INTRAMUSCULAR; INTRAVENOUS ONCE
Status: COMPLETED | OUTPATIENT
Start: 2024-07-11 | End: 2024-07-11

## 2024-07-11 RX ORDER — PROMETHAZINE HYDROCHLORIDE 12.5 MG/1
12.5 TABLET ORAL ONCE AS NEEDED
Status: DISCONTINUED | OUTPATIENT
Start: 2024-07-11 | End: 2024-07-11 | Stop reason: HOSPADM

## 2024-07-11 RX ORDER — ACETAMINOPHEN 325 MG/1
650 TABLET ORAL ONCE
Status: DISCONTINUED | OUTPATIENT
Start: 2024-07-11 | End: 2024-07-11 | Stop reason: HOSPADM

## 2024-07-11 RX ORDER — PROMETHAZINE HYDROCHLORIDE 25 MG/1
25 SUPPOSITORY RECTAL ONCE AS NEEDED
Status: DISCONTINUED | OUTPATIENT
Start: 2024-07-11 | End: 2024-07-11 | Stop reason: HOSPADM

## 2024-07-11 RX ORDER — ONDANSETRON 2 MG/ML
4 INJECTION INTRAMUSCULAR; INTRAVENOUS ONCE AS NEEDED
Status: DISCONTINUED | OUTPATIENT
Start: 2024-07-11 | End: 2024-07-11 | Stop reason: HOSPADM

## 2024-07-11 RX ORDER — PROPOFOL 10 MG/ML
VIAL (ML) INTRAVENOUS AS NEEDED
Status: DISCONTINUED | OUTPATIENT
Start: 2024-07-11 | End: 2024-07-11 | Stop reason: SURG

## 2024-07-11 RX ORDER — OXYCODONE HYDROCHLORIDE AND ACETAMINOPHEN 5; 325 MG/1; MG/1
1 TABLET ORAL EVERY 6 HOURS PRN
Qty: 10 TABLET | Refills: 0 | Status: SHIPPED | OUTPATIENT
Start: 2024-07-11

## 2024-07-11 RX ORDER — LIDOCAINE HYDROCHLORIDE 20 MG/ML
INJECTION, SOLUTION EPIDURAL; INFILTRATION; INTRACAUDAL; PERINEURAL AS NEEDED
Status: DISCONTINUED | OUTPATIENT
Start: 2024-07-11 | End: 2024-07-11 | Stop reason: SURG

## 2024-07-11 RX ORDER — FENTANYL CITRATE 50 UG/ML
INJECTION, SOLUTION INTRAMUSCULAR; INTRAVENOUS AS NEEDED
Status: DISCONTINUED | OUTPATIENT
Start: 2024-07-11 | End: 2024-07-11 | Stop reason: SURG

## 2024-07-11 RX ADMIN — SODIUM CHLORIDE 1000 MG: 9 INJECTION, SOLUTION INTRAVENOUS at 12:47

## 2024-07-11 RX ADMIN — DEXMEDETOMIDINE HYDROCHLORIDE 10 MCG: 100 INJECTION, SOLUTION, CONCENTRATE INTRAVENOUS at 13:35

## 2024-07-11 RX ADMIN — METOCLOPRAMIDE HYDROCHLORIDE 10 MG: 5 INJECTION INTRAMUSCULAR; INTRAVENOUS at 12:15

## 2024-07-11 RX ADMIN — DEXMEDETOMIDINE HYDROCHLORIDE 10 MCG: 100 INJECTION, SOLUTION, CONCENTRATE INTRAVENOUS at 13:16

## 2024-07-11 RX ADMIN — PROPOFOL 150 MG: 10 INJECTION, EMULSION INTRAVENOUS at 12:39

## 2024-07-11 RX ADMIN — FENTANYL CITRATE 50 MCG: 50 INJECTION, SOLUTION INTRAMUSCULAR; INTRAVENOUS at 12:39

## 2024-07-11 RX ADMIN — MIDAZOLAM HYDROCHLORIDE 2 MG: 1 INJECTION, SOLUTION INTRAMUSCULAR; INTRAVENOUS at 12:14

## 2024-07-11 RX ADMIN — SODIUM CHLORIDE, POTASSIUM CHLORIDE, SODIUM LACTATE AND CALCIUM CHLORIDE 9 ML/HR: 600; 310; 30; 20 INJECTION, SOLUTION INTRAVENOUS at 11:38

## 2024-07-11 RX ADMIN — DEXMEDETOMIDINE HYDROCHLORIDE 10 MCG: 100 INJECTION, SOLUTION, CONCENTRATE INTRAVENOUS at 12:39

## 2024-07-11 RX ADMIN — DEXAMETHASONE SODIUM PHOSPHATE 4 MG: 4 INJECTION, SOLUTION INTRAMUSCULAR; INTRAVENOUS at 12:52

## 2024-07-11 RX ADMIN — SODIUM CHLORIDE 2000 MG: 9 INJECTION, SOLUTION INTRAVENOUS at 12:42

## 2024-07-11 RX ADMIN — DEXMEDETOMIDINE HYDROCHLORIDE 10 MCG: 100 INJECTION, SOLUTION, CONCENTRATE INTRAVENOUS at 12:52

## 2024-07-11 RX ADMIN — ACETAMINOPHEN 1000 MG: 500 TABLET ORAL at 11:39

## 2024-07-11 RX ADMIN — ONDANSETRON HYDROCHLORIDE 4 MG: 2 SOLUTION INTRAMUSCULAR; INTRAVENOUS at 12:52

## 2024-07-11 RX ADMIN — FENTANYL CITRATE 50 MCG: 50 INJECTION, SOLUTION INTRAMUSCULAR; INTRAVENOUS at 12:58

## 2024-07-11 RX ADMIN — LIDOCAINE HYDROCHLORIDE 100 MG: 20 INJECTION, SOLUTION INTRAVENOUS at 12:39
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
## 2024-07-11 NOTE — DISCHARGE INSTRUCTIONS
DISCHARGE INSTRUCTIONS  SURGICAL / AMBULATORY  PROCEDURES      For your surgery you had:  General anesthesia (you may have a sore throat for the first 24 hours)    You may experience dizziness, drowsiness, or light-headedness for several hours following surgery/procedure.  Do not stay alone today or tonight.  Limit your activity for 24 hours.  Resume your diet slowly.  Follow whatever special dietary instructions you may have been given by your doctor.  You should not drive or operate machinery, drink alcohol, or sign legally binding documents for 24 hours or while you are taking pain medication.    Last dose of pain medication was given at:    TYLENOL 1000 MG AT 11:39 AM .    NOTIFY YOUR DOCTOR IF YOU EXPERIENCE ANY OF THE FOLLOWING:  Temperature greater than 101 degrees Fahrenheit  Shaking Chills  Redness or excessive drainage from incision  Nausea, vomiting and/or pain that is not controlled by prescribed medications  Increase in bleeding or bleeding that is excessive  Unable to urinate in 6 hours after surgery  If unable to reach your doctor, please go to the closest Emergency Room    May remove scrotal support tomorrow. After, wear as needed for comfort.   You may shower tomorrow.  Apply an ice pack 24-48 hours.  Medications per physician instructions as indicated on Discharge Medication Information Sheet.      SPECIAL INSTRUCTIONS:     PLEASE FOLLOW ALL WRITTEN AND VERBAL INSTRUCTIONS OF DR AMBROCIO.

## 2024-07-11 NOTE — OP NOTE
SPERMATOCELECTOMY  Procedure Report    Patient Name:  Abdiel Terrell  YOB: 1990    Date of Surgery:  7/11/2024     Pre-op Diagnosis:   Spermatocele [N43.40]       Post-Op Diagnosis Codes:     * Spermatocele [N43.40]      Procedure/CPT® Codes:    Procedure(s):  RIGHT SPERMATOCELECTOMY      Staff:  Surgeon(s):  Lanie Montes De Oca MD    Assistant: Tl Barrientos RN CSA    Anesthesia: General    Estimated Blood Loss: 5 mL    Implants:    Nothing was implanted during the procedure    Specimen:          Specimens       ID Source Type Tests Collected By Collected At Frozen?    A Scrotum Tissue TISSUE PATHOLOGY EXAM   Lanie Montes De Oca MD 7/11/24 0712     Description: SPERMATOCELE                Complications: None    Description of Procedure:     After proper consent was obtained, patient was taken to the operating room and placed in supine position.  After induction of anesthesia he was prepped and draped in the normal sterile fashion for a right spermatocele excision.     An incision was made over the right hemiscrotum and this was taken down to the level of the spermatic cord using blunt and sharp dissection.  Once the cord was exposed, it was delivered through the incision and out to be examined.      There was a spermatocele/sperm granuloma at the point of previous vasectomy.  It did not appear infected.  This was excised using blunt and sharp dissection.  It was removed entirely and sent to pathology.      The rest of the cord was normal.   The wound was irrigated with sterile water.      At this point, the spermatic cord was placed back into the incision.  Dartos fascia was closed in a running fashion.  The skin was then closed using interrupted chromic sutures.  Local anesthetic, 10cc of lidocaine was used.  Scrotal support was then applied.      The patient tolerated the procedure well and was transferred to PACU in stable condition.  All counts were correct at the end of the  procedure.      Assistant: Tl Barrientos RN CSA  was responsible for performing the following activities: Retraction, Suction, Irrigation, Suturing, Closing, and Placing Dressing and their skilled assistance was necessary for the success of this case.    Lanie Montes De Oca MD     Date: 7/11/2024  Time: 13:50 EDT

## 2024-07-11 NOTE — ANESTHESIA POSTPROCEDURE EVALUATION
Patient: Abdiel Terrell    Procedure Summary       Date: 07/11/24 Room / Location: [facility] OR 04 / [facility] MAIN OR    Anesthesia Start: 1232 Anesthesia Stop: 1343    Procedure: SPERMATOCELECTOMY (Right) Diagnosis:       Spermatocele      (Spermatocele [N43.40])    Surgeons: Lanie Montes De Oca MD Provider: Wisam Macias MD    Anesthesia Type: general ASA Status: 2            Anesthesia Type: general    Vitals  Vitals Value Taken Time   /72 07/11/24 1411   Temp     Pulse 75 07/11/24 1413   Resp     SpO2 97 % 07/11/24 1412   Vitals shown include unfiled device data.        Post Anesthesia Care and Evaluation    Patient location during evaluation: bedside  Patient participation: complete - patient participated  Level of consciousness: awake  Pain management: adequate    Airway patency: patent  PONV Status: none  Cardiovascular status: acceptable and stable  Respiratory status: acceptable  Hydration status: acceptable

## 2024-07-11 NOTE — ANESTHESIA PREPROCEDURE EVALUATION
Anesthesia Evaluation     Patient summary reviewed and Nursing notes reviewed   no history of anesthetic complications:   NPO Solid Status: > 8 hours  NPO Liquid Status: > 2 hours           Airway   Mallampati: III  TM distance: >3 FB  Neck ROM: full  No difficulty expected  Dental      Pulmonary - negative pulmonary ROS and normal exam    breath sounds clear to auscultation  Cardiovascular - negative cardio ROS and normal exam  Exercise tolerance: good (4-7 METS)    Rhythm: regular  Rate: normal        Neuro/Psych- negative ROS  (+) psychiatric history Anxiety  GI/Hepatic/Renal/Endo - negative ROS   (+) GERD, diabetes mellitus type 2    Musculoskeletal (-) negative ROS    Abdominal    Substance History - negative use     OB/GYN negative ob/gyn ROS         Other - negative ROS       ROS/Med Hx Other: PAT Nursing Notes unavailable.                     Anesthesia Plan    ASA 2     general     (Patient understands anesthesia not responsible for dental damage.)  intravenous induction     Anesthetic plan, risks, benefits, and alternatives have been provided, discussed and informed consent has been obtained with: patient.    Use of blood products discussed with patient .    Plan discussed with CRNA.        CODE STATUS:

## 2024-07-17 ENCOUNTER — OFFICE VISIT (OUTPATIENT)
Dept: UROLOGY | Facility: CLINIC | Age: 34
End: 2024-07-17
Payer: COMMERCIAL

## 2024-07-17 VITALS
HEIGHT: 74 IN | BODY MASS INDEX: 38.76 KG/M2 | SYSTOLIC BLOOD PRESSURE: 129 MMHG | DIASTOLIC BLOOD PRESSURE: 80 MMHG | WEIGHT: 302 LBS

## 2024-07-17 DIAGNOSIS — N43.40 SPERMATOCELE: Primary | ICD-10-CM

## 2024-07-17 PROCEDURE — 99024 POSTOP FOLLOW-UP VISIT: CPT | Performed by: UROLOGY

## 2024-07-17 NOTE — PROGRESS NOTES
"Chief Complaint  Spermatocele (Post op)    Subjective          Abdiel Terrell presents to [facility] UROLOGY    History of Present Illness  Patient is 1 week status post spermatocelectomy.  He has no complaints.  Incision healing well.  Patient is back to work.  Pathology revealed spermatocele.      Objective   Vital Signs:   /80   Ht 188 cm (74\")   Wt (!) 137 kg (302 lb)   BMI 38.77 kg/m²       Physical Exam  Vitals and nursing note reviewed.   Constitutional:       Appearance: Normal appearance. He is well-developed.   Pulmonary:      Effort: Pulmonary effort is normal.      Breath sounds: Normal air entry.   Genitourinary:     Comments: Incision healing well, no signs of infection minimal scrotal swelling and bruising.  Neurological:      Mental Status: He is alert and oriented to person, place, and time.      Motor: Motor function is intact.   Psychiatric:         Mood and Affect: Mood normal.         Behavior: Behavior normal.          Result Review :   The following data was reviewed by: Lanie Montes De Oca MD on 07/17/2024:    Results for orders placed or performed during the hospital encounter of 07/11/24   Basic Metabolic Panel    Specimen: Arm, Left; Blood   Result Value Ref Range    Glucose 107 (H) 65 - 99 mg/dL    BUN 15 6 - 20 mg/dL    Creatinine 1.20 0.76 - 1.27 mg/dL    Sodium 138 136 - 145 mmol/L    Potassium 4.8 3.5 - 5.2 mmol/L    Chloride 105 98 - 107 mmol/L    CO2 21.7 (L) 22.0 - 29.0 mmol/L    Calcium 9.6 8.6 - 10.5 mg/dL    BUN/Creatinine Ratio 12.5 7.0 - 25.0    Anion Gap 11.3 5.0 - 15.0 mmol/L    eGFR 80.9 >60.0 mL/min/1.73   POC Glucose Once    Specimen: Blood   Result Value Ref Range    Glucose 115 (H) 70 - 99 mg/dL   Tissue Pathology Exam    Specimen: Scrotum; Tissue   Result Value Ref Range    Case Report       Surgical Pathology Report                         Case: [id number]                                  Authorizing Provider:  Lanie Montes De Oca MD      " "Collected:           07/11/2024 01:29 PM          Ordering Location:     [facility] MAIN Received:            07/12/2024 08:18 AM                                 OR                                                                           Pathologist:           Abiodun Franco MD                                                            Specimen:    Scrotum, SPERMATOCELE                                                                      Clinical Information       Spermatocele      Final Diagnosis       Spermatocele, excision:   - Consistent with spermatocele      Gross Description       1. Scrotum.  Received in formalin and labeled \"spermatocele\" is a 2.0 cm aggregate of cauterized soft tissue fragments on a Telfa pad.  Sectioning reveals a tan, rubbery cut surface.  Representative sections are submitted in 1 cassette.   BEVERLY      Microscopic Description       Microscopic examination performed.                Assessment and Plan    Diagnoses and all orders for this visit:    1. Spermatocele (Primary)    Follow-up in 4 to 6 weeks for next postop check.  Patient can resume normal activities.        Follow Up       No follow-ups on file.  Patient was given instructions and counseling regarding his condition or for health maintenance advice. Please see specific information pulled into the AVS if appropriate.       "

## 2024-08-02 ENCOUNTER — TELEPHONE (OUTPATIENT)
Dept: GASTROENTEROLOGY | Facility: CLINIC | Age: 34
End: 2024-08-02
Payer: COMMERCIAL

## 2024-08-02 NOTE — TELEPHONE ENCOUNTER
----- Message from [facility] [employer] sent at 7/18/2024  9:47 AM EDT -----  Regarding: Appointment canceled  Contact: [phone]  Appointment canceled for Abdiel Terrell ([id number])  Visit Type: FOLLOW UP  Date        Time      Length    Provider                  Department  7/25/2024   10:30 AM  15 mins.  Ghada Medeiros        [facility] [place] [facility] [place] [facility]    Reason for Cancellation: Other    Patient Comments: Need to reschedule

## 2024-08-02 NOTE — TELEPHONE ENCOUNTER
Attempted to contact patient to see if they wanted to reschedule at this time. Spoke with patient and he is rescheduled in October.

## 2024-08-05 DIAGNOSIS — R61 HYPERHIDROSIS: ICD-10-CM

## 2024-08-05 RX ORDER — ALUMINUM CHLORIDE 20 %
SOLUTION, NON-ORAL TOPICAL
Qty: 35 ML | Refills: 0 | Status: SHIPPED | OUTPATIENT
Start: 2024-08-05

## 2024-08-05 NOTE — TELEPHONE ENCOUNTER
TCM 6/18/24, 4/10/24  Cancelled 6/11/24, 3/4/24  Last f/u 1/8/24  No f/u appt scheduled - sent MyChart appt to patient

## 2024-08-16 ENCOUNTER — OFFICE VISIT (OUTPATIENT)
Dept: UROLOGY | Facility: CLINIC | Age: 34
End: 2024-08-16
Payer: COMMERCIAL

## 2024-08-16 VITALS
HEIGHT: 74 IN | DIASTOLIC BLOOD PRESSURE: 75 MMHG | SYSTOLIC BLOOD PRESSURE: 125 MMHG | WEIGHT: 302 LBS | BODY MASS INDEX: 38.76 KG/M2

## 2024-08-16 DIAGNOSIS — N43.40 SPERMATOCELE: Primary | ICD-10-CM

## 2024-08-16 NOTE — PROGRESS NOTES
"Chief Complaint  Spermatocele    Subjective          Abdiel Rico Terrell presents to [facility] UROLOGY    History of Present Illness  Patient is one month s/p spermatocelectomy.  He has no new complaints.        Objective   Vital Signs:   /75   Ht 188 cm (74\")   Wt (!) 137 kg (302 lb)   BMI 38.77 kg/m²       Physical Exam  Genitourinary:     Comments: Incision well healed, no scrotal swelling or bruising         Result Review :   The following data was reviewed by: Lanie Montes De Oca MD on 08/16/2024:    Results for orders placed or performed in visit on 08/16/24   POC Urinalysis Dipstick, Automated    Specimen: Urine   Result Value Ref Range    Color Yellow Yellow, Straw, Dark Yellow, Adela    Clarity, UA Clear Clear    Specific Gravity  1.030 1.005 - 1.030    pH, Urine 5.5 5.0 - 8.0    Leukocytes Negative Negative    Nitrite, UA Negative Negative    Protein, POC Negative Negative mg/dL    Glucose, UA Negative Negative mg/dL    Ketones, UA Negative Negative    Urobilinogen, UA 0.2 E.U./dL Normal, 0.2 E.U./dL    Bilirubin Negative Negative    Blood, UA Negative Negative    Lot Number [id number]     Expiration Date 62,025               Assessment and Plan    Diagnoses and all orders for this visit:    1. Spermatocele (Primary)  -     POC Urinalysis Dipstick, Automated    He will follow up PRN.  Incision healed well.          Follow Up       No follow-ups on file.  Patient was given instructions and counseling regarding his condition or for health maintenance advice. Please see specific information pulled into the AVS if appropriate.       "

## 2024-08-20 ENCOUNTER — OFFICE VISIT (OUTPATIENT)
Dept: FAMILY MEDICINE CLINIC | Facility: CLINIC | Age: 34
End: 2024-08-20
Payer: COMMERCIAL

## 2024-08-20 VITALS
TEMPERATURE: 97.9 F | HEIGHT: 74 IN | HEART RATE: 92 BPM | DIASTOLIC BLOOD PRESSURE: 56 MMHG | SYSTOLIC BLOOD PRESSURE: 117 MMHG | OXYGEN SATURATION: 98 % | WEIGHT: 301.8 LBS | BODY MASS INDEX: 38.73 KG/M2

## 2024-08-20 DIAGNOSIS — R61 HYPERHIDROSIS: ICD-10-CM

## 2024-08-20 DIAGNOSIS — Z87.19 HISTORY OF PANCREATITIS: ICD-10-CM

## 2024-08-20 DIAGNOSIS — R63.5 WEIGHT GAIN: ICD-10-CM

## 2024-08-20 DIAGNOSIS — J30.9 ALLERGIC RHINITIS, UNSPECIFIED SEASONALITY, UNSPECIFIED TRIGGER: ICD-10-CM

## 2024-08-20 DIAGNOSIS — R74.8 ELEVATED LIPASE: ICD-10-CM

## 2024-08-20 DIAGNOSIS — E11.9 TYPE 2 DIABETES MELLITUS WITHOUT COMPLICATION, WITHOUT LONG-TERM CURRENT USE OF INSULIN: ICD-10-CM

## 2024-08-20 DIAGNOSIS — K21.9 GASTROESOPHAGEAL REFLUX DISEASE WITHOUT ESOPHAGITIS: Primary | ICD-10-CM

## 2024-08-20 PROCEDURE — 99214 OFFICE O/P EST MOD 30 MIN: CPT | Performed by: NURSE PRACTITIONER

## 2024-08-20 RX ORDER — OMEPRAZOLE 20 MG/1
20 CAPSULE, DELAYED RELEASE ORAL DAILY
Qty: 90 CAPSULE | Refills: 1 | Status: SHIPPED | OUTPATIENT
Start: 2024-08-20

## 2024-08-20 RX ORDER — ALUMINUM CHLORIDE 20 %
SOLUTION, NON-ORAL TOPICAL DAILY
Qty: 35 ML | Refills: 5 | Status: SHIPPED | OUTPATIENT
Start: 2024-08-20
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
## 2024-08-27 ENCOUNTER — LAB (OUTPATIENT)
Dept: LAB | Facility: HOSPITAL | Age: 34
End: 2024-08-27
Payer: COMMERCIAL

## 2024-08-27 DIAGNOSIS — R74.8 ELEVATED LIPASE: ICD-10-CM

## 2024-08-27 DIAGNOSIS — Z87.19 HISTORY OF PANCREATITIS: ICD-10-CM

## 2024-08-27 DIAGNOSIS — E11.9 TYPE 2 DIABETES MELLITUS WITHOUT COMPLICATION, WITHOUT LONG-TERM CURRENT USE OF INSULIN: ICD-10-CM

## 2024-08-27 LAB
ALBUMIN SERPL-MCNC: 4.4 G/DL (ref 3.5–5.2)
ALBUMIN UR-MCNC: <1.2 MG/DL
ALBUMIN/GLOB SERPL: 1.5 G/DL
ALP SERPL-CCNC: 24 U/L (ref 39–117)
ALT SERPL W P-5'-P-CCNC: 17 U/L (ref 1–41)
ANION GAP SERPL CALCULATED.3IONS-SCNC: 10 MMOL/L (ref 5–15)
AST SERPL-CCNC: 15 U/L (ref 1–40)
BASOPHILS # BLD AUTO: 0.04 10*3/MM3 (ref 0–0.2)
BASOPHILS NFR BLD AUTO: 0.5 % (ref 0–1.5)
BILIRUB SERPL-MCNC: 0.4 MG/DL (ref 0–1.2)
BUN SERPL-MCNC: 17 MG/DL (ref 6–20)
BUN/CREAT SERPL: 13.3 (ref 7–25)
CALCIUM SPEC-SCNC: 10.1 MG/DL (ref 8.6–10.5)
CHLORIDE SERPL-SCNC: 105 MMOL/L (ref 98–107)
CHOLEST SERPL-MCNC: 183 MG/DL (ref 0–200)
CO2 SERPL-SCNC: 26 MMOL/L (ref 22–29)
CREAT SERPL-MCNC: 1.28 MG/DL (ref 0.76–1.27)
CREAT UR-MCNC: 131.2 MG/DL
DEPRECATED RDW RBC AUTO: 42.9 FL (ref 37–54)
EGFRCR SERPLBLD CKD-EPI 2021: 74.9 ML/MIN/1.73
EOSINOPHIL # BLD AUTO: 0.1 10*3/MM3 (ref 0–0.4)
EOSINOPHIL NFR BLD AUTO: 1.2 % (ref 0.3–6.2)
ERYTHROCYTE [DISTWIDTH] IN BLOOD BY AUTOMATED COUNT: 14.6 % (ref 12.3–15.4)
GLOBULIN UR ELPH-MCNC: 2.9 GM/DL
GLUCOSE SERPL-MCNC: 98 MG/DL (ref 65–99)
HBA1C MFR BLD: 5.8 % (ref 4.8–5.6)
HCT VFR BLD AUTO: 44 % (ref 37.5–51)
HDLC SERPL-MCNC: 52 MG/DL (ref 40–60)
HGB BLD-MCNC: 14 G/DL (ref 13–17.7)
IMM GRANULOCYTES # BLD AUTO: 0.04 10*3/MM3 (ref 0–0.05)
IMM GRANULOCYTES NFR BLD AUTO: 0.5 % (ref 0–0.5)
LDLC SERPL CALC-MCNC: 116 MG/DL (ref 0–100)
LDLC/HDLC SERPL: 2.21 {RATIO}
LIPASE SERPL-CCNC: 60 U/L (ref 13–60)
LYMPHOCYTES # BLD AUTO: 2.4 10*3/MM3 (ref 0.7–3.1)
LYMPHOCYTES NFR BLD AUTO: 29.3 % (ref 19.6–45.3)
MCH RBC QN AUTO: 26.3 PG (ref 26.6–33)
MCHC RBC AUTO-ENTMCNC: 31.8 G/DL (ref 31.5–35.7)
MCV RBC AUTO: 82.6 FL (ref 79–97)
MICROALBUMIN/CREAT UR: NORMAL MG/G{CREAT}
MONOCYTES # BLD AUTO: 0.72 10*3/MM3 (ref 0.1–0.9)
MONOCYTES NFR BLD AUTO: 8.8 % (ref 5–12)
NEUTROPHILS NFR BLD AUTO: 4.88 10*3/MM3 (ref 1.7–7)
NEUTROPHILS NFR BLD AUTO: 59.7 % (ref 42.7–76)
NRBC BLD AUTO-RTO: 0 /100 WBC (ref 0–0.2)
PLATELET # BLD AUTO: 236 10*3/MM3 (ref 140–450)
PMV BLD AUTO: 9.4 FL (ref 6–12)
POTASSIUM SERPL-SCNC: 5.1 MMOL/L (ref 3.5–5.2)
PROT SERPL-MCNC: 7.3 G/DL (ref 6–8.5)
RBC # BLD AUTO: 5.33 10*6/MM3 (ref 4.14–5.8)
SODIUM SERPL-SCNC: 141 MMOL/L (ref 136–145)
TRIGL SERPL-MCNC: 81 MG/DL (ref 0–150)
TSH SERPL DL<=0.05 MIU/L-ACNC: 1.19 UIU/ML (ref 0.27–4.2)
VLDLC SERPL-MCNC: 15 MG/DL (ref 5–40)
WBC NRBC COR # BLD AUTO: 8.18 10*3/MM3 (ref 3.4–10.8)

## 2024-08-27 PROCEDURE — 83036 HEMOGLOBIN GLYCOSYLATED A1C: CPT

## 2024-08-27 PROCEDURE — 83690 ASSAY OF LIPASE: CPT

## 2024-08-27 PROCEDURE — 82043 UR ALBUMIN QUANTITATIVE: CPT

## 2024-08-27 PROCEDURE — 82570 ASSAY OF URINE CREATININE: CPT

## 2024-08-27 PROCEDURE — 80050 GENERAL HEALTH PANEL: CPT

## 2024-08-27 PROCEDURE — 36415 COLL VENOUS BLD VENIPUNCTURE: CPT

## 2024-08-27 PROCEDURE — 80061 LIPID PANEL: CPT

## 2024-08-30 RX ORDER — LISINOPRIL 10 MG/1
10 TABLET ORAL DAILY
Qty: 90 TABLET | Refills: 1 | Status: SHIPPED | OUTPATIENT
Start: 2024-08-30

## 2024-09-12 ENCOUNTER — APPOINTMENT (OUTPATIENT)
Dept: DIABETES SERVICES | Facility: HOSPITAL | Age: 34
End: 2024-09-12
Payer: COMMERCIAL

## 2024-09-12 DIAGNOSIS — E11.9 TYPE 2 DIABETES MELLITUS WITHOUT COMPLICATION, WITHOUT LONG-TERM CURRENT USE OF INSULIN: Primary | ICD-10-CM

## 2024-09-12 DIAGNOSIS — R63.5 WEIGHT GAIN: ICD-10-CM

## 2024-09-12 PROCEDURE — 87491 CHLMYD TRACH DNA AMP PROBE: CPT | Performed by: NURSE PRACTITIONER

## 2024-09-12 PROCEDURE — 87661 TRICHOMONAS VAGINALIS AMPLIF: CPT | Performed by: NURSE PRACTITIONER

## 2024-09-12 PROCEDURE — 97802 MEDICAL NUTRITION INDIV IN: CPT | Performed by: DIETITIAN, REGISTERED

## 2024-09-12 PROCEDURE — 87591 N.GONORRHOEAE DNA AMP PROB: CPT | Performed by: NURSE PRACTITIONER

## 2024-10-01 VITALS — HEIGHT: 74 IN | BODY MASS INDEX: 38.63 KG/M2 | WEIGHT: 301 LBS
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
## 2024-10-23 DIAGNOSIS — J30.1 ALLERGIC RHINITIS DUE TO POLLEN, UNSPECIFIED SEASONALITY: ICD-10-CM

## 2024-10-23 RX ORDER — LEVOCETIRIZINE DIHYDROCHLORIDE 5 MG/1
5 TABLET, FILM COATED ORAL DAILY
Qty: 90 TABLET | Refills: 1 | Status: SHIPPED | OUTPATIENT
Start: 2024-10-23

## 2024-11-20 DIAGNOSIS — E11.9 TYPE 2 DIABETES MELLITUS WITHOUT COMPLICATION, WITHOUT LONG-TERM CURRENT USE OF INSULIN: ICD-10-CM

## 2024-11-20 RX ORDER — METFORMIN HYDROCHLORIDE 500 MG/1
500 TABLET, EXTENDED RELEASE ORAL
Qty: 90 TABLET | Refills: 1 | Status: SHIPPED | OUTPATIENT
Start: 2024-11-20

## 2025-02-25 ENCOUNTER — OFFICE VISIT (OUTPATIENT)
Dept: FAMILY MEDICINE CLINIC | Facility: CLINIC | Age: 35
End: 2025-02-25
Payer: COMMERCIAL

## 2025-02-25 VITALS
DIASTOLIC BLOOD PRESSURE: 72 MMHG | OXYGEN SATURATION: 98 % | BODY MASS INDEX: 38.5 KG/M2 | HEIGHT: 74 IN | WEIGHT: 300 LBS | HEART RATE: 102 BPM | SYSTOLIC BLOOD PRESSURE: 141 MMHG

## 2025-02-25 DIAGNOSIS — K21.9 GASTROESOPHAGEAL REFLUX DISEASE WITHOUT ESOPHAGITIS: ICD-10-CM

## 2025-02-25 DIAGNOSIS — Z00.00 ANNUAL PHYSICAL EXAM: Primary | ICD-10-CM

## 2025-02-25 DIAGNOSIS — E11.9 TYPE 2 DIABETES MELLITUS WITHOUT COMPLICATION, WITHOUT LONG-TERM CURRENT USE OF INSULIN: ICD-10-CM

## 2025-02-25 DIAGNOSIS — J01.00 ACUTE NON-RECURRENT MAXILLARY SINUSITIS: ICD-10-CM

## 2025-02-25 DIAGNOSIS — T73.2XXA FATIGUE DUE TO EXPOSURE, INITIAL ENCOUNTER: ICD-10-CM

## 2025-02-25 DIAGNOSIS — J30.1 ALLERGIC RHINITIS DUE TO POLLEN, UNSPECIFIED SEASONALITY: ICD-10-CM

## 2025-02-25 PROBLEM — R10.13 EPIGASTRIC PAIN: Status: RESOLVED | Noted: 2022-03-07 | Resolved: 2025-02-25

## 2025-02-25 PROBLEM — Z30.09 STERILIZATION CONSULT: Status: RESOLVED | Noted: 2022-02-07 | Resolved: 2025-02-25

## 2025-02-25 PROBLEM — R10.31 RIGHT GROIN PAIN: Status: RESOLVED | Noted: 2024-04-16 | Resolved: 2025-02-25

## 2025-02-25 PROCEDURE — 99395 PREV VISIT EST AGE 18-39: CPT | Performed by: NURSE PRACTITIONER

## 2025-02-25 RX ORDER — DEXTROMETHORPHAN HYDROBROMIDE AND PROMETHAZINE HYDROCHLORIDE 15; 6.25 MG/5ML; MG/5ML
5 SYRUP ORAL 4 TIMES DAILY PRN
Qty: 180 ML | Refills: 0 | Status: SHIPPED | OUTPATIENT
Start: 2025-02-25

## 2025-02-25 RX ORDER — LISINOPRIL 10 MG/1
10 TABLET ORAL DAILY
Qty: 90 TABLET | Refills: 1 | Status: SHIPPED | OUTPATIENT
Start: 2025-02-25

## 2025-02-25 RX ORDER — METFORMIN HYDROCHLORIDE 500 MG/1
500 TABLET, EXTENDED RELEASE ORAL
Qty: 90 TABLET | Refills: 1 | Status: SHIPPED | OUTPATIENT
Start: 2025-02-25

## 2025-02-25 RX ORDER — LEVOCETIRIZINE DIHYDROCHLORIDE 5 MG/1
5 TABLET, FILM COATED ORAL DAILY
Qty: 90 TABLET | Refills: 1 | Status: SHIPPED | OUTPATIENT
Start: 2025-02-25

## 2025-02-25 RX ORDER — OMEPRAZOLE 20 MG/1
20 CAPSULE, DELAYED RELEASE ORAL DAILY
Qty: 90 CAPSULE | Refills: 1 | Status: SHIPPED | OUTPATIENT
Start: 2025-02-25

## 2025-02-25 RX ORDER — AZITHROMYCIN 250 MG/1
TABLET, FILM COATED ORAL
Qty: 6 TABLET | Refills: 0 | Status: SHIPPED | OUTPATIENT
Start: 2025-02-25
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
## 2025-03-05 ENCOUNTER — LAB (OUTPATIENT)
Dept: LAB | Facility: HOSPITAL | Age: 35
End: 2025-03-05
Payer: COMMERCIAL

## 2025-03-05 DIAGNOSIS — E11.9 TYPE 2 DIABETES MELLITUS WITHOUT COMPLICATION, WITHOUT LONG-TERM CURRENT USE OF INSULIN: ICD-10-CM

## 2025-03-05 DIAGNOSIS — Z00.00 ANNUAL PHYSICAL EXAM: ICD-10-CM

## 2025-03-05 DIAGNOSIS — T73.2XXA FATIGUE DUE TO EXPOSURE, INITIAL ENCOUNTER: ICD-10-CM

## 2025-03-05 LAB
ALBUMIN SERPL-MCNC: 3.8 G/DL (ref 3.5–5.2)
ALBUMIN UR-MCNC: 1.2 MG/DL
ALBUMIN/GLOB SERPL: 1.2 G/DL
ALP SERPL-CCNC: 19 U/L (ref 39–117)
ALT SERPL W P-5'-P-CCNC: 20 U/L (ref 1–41)
ANION GAP SERPL CALCULATED.3IONS-SCNC: 11.3 MMOL/L (ref 5–15)
AST SERPL-CCNC: 23 U/L (ref 1–40)
BASOPHILS # BLD AUTO: 0.05 10*3/MM3 (ref 0–0.2)
BASOPHILS NFR BLD AUTO: 0.6 % (ref 0–1.5)
BILIRUB SERPL-MCNC: 0.4 MG/DL (ref 0–1.2)
BUN SERPL-MCNC: 11 MG/DL (ref 6–20)
BUN/CREAT SERPL: 8.3 (ref 7–25)
CALCIUM SPEC-SCNC: 10.1 MG/DL (ref 8.6–10.5)
CHLORIDE SERPL-SCNC: 105 MMOL/L (ref 98–107)
CHOLEST SERPL-MCNC: 169 MG/DL (ref 0–200)
CO2 SERPL-SCNC: 24.7 MMOL/L (ref 22–29)
CREAT SERPL-MCNC: 1.32 MG/DL (ref 0.76–1.27)
CREAT UR-MCNC: 198.1 MG/DL
DEPRECATED RDW RBC AUTO: 43.5 FL (ref 37–54)
EGFRCR SERPLBLD CKD-EPI 2021: 72.1 ML/MIN/1.73
EOSINOPHIL # BLD AUTO: 0.08 10*3/MM3 (ref 0–0.4)
EOSINOPHIL NFR BLD AUTO: 1 % (ref 0.3–6.2)
ERYTHROCYTE [DISTWIDTH] IN BLOOD BY AUTOMATED COUNT: 14.7 % (ref 12.3–15.4)
GLOBULIN UR ELPH-MCNC: 3.3 GM/DL
GLUCOSE SERPL-MCNC: 100 MG/DL (ref 65–99)
HBA1C MFR BLD: 5.9 % (ref 4.8–5.6)
HCT VFR BLD AUTO: 44.2 % (ref 37.5–51)
HDLC SERPL-MCNC: 37 MG/DL (ref 40–60)
HGB BLD-MCNC: 14.1 G/DL (ref 13–17.7)
IMM GRANULOCYTES # BLD AUTO: 0.09 10*3/MM3 (ref 0–0.05)
IMM GRANULOCYTES NFR BLD AUTO: 1.1 % (ref 0–0.5)
LDLC SERPL CALC-MCNC: 112 MG/DL (ref 0–100)
LDLC/HDLC SERPL: 2.98 {RATIO}
LYMPHOCYTES # BLD AUTO: 2.28 10*3/MM3 (ref 0.7–3.1)
LYMPHOCYTES NFR BLD AUTO: 28.1 % (ref 19.6–45.3)
MCH RBC QN AUTO: 26.1 PG (ref 26.6–33)
MCHC RBC AUTO-ENTMCNC: 31.9 G/DL (ref 31.5–35.7)
MCV RBC AUTO: 81.7 FL (ref 79–97)
MICROALBUMIN/CREAT UR: 6.1 MG/G (ref 0–29)
MONOCYTES # BLD AUTO: 0.7 10*3/MM3 (ref 0.1–0.9)
MONOCYTES NFR BLD AUTO: 8.6 % (ref 5–12)
NEUTROPHILS NFR BLD AUTO: 4.9 10*3/MM3 (ref 1.7–7)
NEUTROPHILS NFR BLD AUTO: 60.6 % (ref 42.7–76)
NRBC BLD AUTO-RTO: 0 /100 WBC (ref 0–0.2)
PLATELET # BLD AUTO: 267 10*3/MM3 (ref 140–450)
PMV BLD AUTO: 9.6 FL (ref 6–12)
POTASSIUM SERPL-SCNC: 4.3 MMOL/L (ref 3.5–5.2)
PROT SERPL-MCNC: 7.1 G/DL (ref 6–8.5)
RBC # BLD AUTO: 5.41 10*6/MM3 (ref 4.14–5.8)
SODIUM SERPL-SCNC: 141 MMOL/L (ref 136–145)
TRIGL SERPL-MCNC: 108 MG/DL (ref 0–150)
VLDLC SERPL-MCNC: 20 MG/DL (ref 5–40)
WBC NRBC COR # BLD AUTO: 8.1 10*3/MM3 (ref 3.4–10.8)

## 2025-03-05 PROCEDURE — 36415 COLL VENOUS BLD VENIPUNCTURE: CPT

## 2025-03-05 PROCEDURE — 82043 UR ALBUMIN QUANTITATIVE: CPT

## 2025-03-05 PROCEDURE — 84403 ASSAY OF TOTAL TESTOSTERONE: CPT

## 2025-03-05 PROCEDURE — 84402 ASSAY OF FREE TESTOSTERONE: CPT

## 2025-03-05 PROCEDURE — 80061 LIPID PANEL: CPT

## 2025-03-05 PROCEDURE — 83036 HEMOGLOBIN GLYCOSYLATED A1C: CPT

## 2025-03-05 PROCEDURE — 85025 COMPLETE CBC W/AUTO DIFF WBC: CPT

## 2025-03-05 PROCEDURE — 82570 ASSAY OF URINE CREATININE: CPT

## 2025-03-05 PROCEDURE — 80053 COMPREHEN METABOLIC PANEL: CPT

## 2025-03-10 ENCOUNTER — RESULTS FOLLOW-UP (OUTPATIENT)
Dept: FAMILY MEDICINE CLINIC | Facility: CLINIC | Age: 35
End: 2025-03-10
Payer: COMMERCIAL

## 2025-03-11 LAB
TESTOST FREE SERPL-MCNC: 10.2 PG/ML (ref 8.7–25.1)
TESTOST SERPL-MCNC: 619 NG/DL (ref 264–916)

## 2025-03-22 DIAGNOSIS — R61 HYPERHIDROSIS: ICD-10-CM

## 2025-03-24 RX ORDER — ALUMINUM CHLORIDE 20 %
SOLUTION, NON-ORAL TOPICAL
Qty: 35 ML | Refills: 5 | Status: SHIPPED | OUTPATIENT
Start: 2025-03-24

## 2025-08-27 ENCOUNTER — LAB (OUTPATIENT)
Dept: LAB | Facility: HOSPITAL | Age: 35
End: 2025-08-27
Payer: COMMERCIAL

## 2025-08-27 ENCOUNTER — OFFICE VISIT (OUTPATIENT)
Dept: FAMILY MEDICINE CLINIC | Facility: CLINIC | Age: 35
End: 2025-08-27
Payer: COMMERCIAL

## 2025-08-27 VITALS
OXYGEN SATURATION: 98 % | DIASTOLIC BLOOD PRESSURE: 64 MMHG | HEART RATE: 70 BPM | SYSTOLIC BLOOD PRESSURE: 117 MMHG | WEIGHT: 303.8 LBS | BODY MASS INDEX: 38.99 KG/M2 | HEIGHT: 74 IN | TEMPERATURE: 98.4 F

## 2025-08-27 DIAGNOSIS — K21.9 GASTROESOPHAGEAL REFLUX DISEASE WITHOUT ESOPHAGITIS: ICD-10-CM

## 2025-08-27 DIAGNOSIS — R61 HYPERHIDROSIS: ICD-10-CM

## 2025-08-27 DIAGNOSIS — G89.29 CHRONIC PAIN OF LEFT KNEE: ICD-10-CM

## 2025-08-27 DIAGNOSIS — E11.9 TYPE 2 DIABETES MELLITUS WITHOUT COMPLICATION, WITHOUT LONG-TERM CURRENT USE OF INSULIN: ICD-10-CM

## 2025-08-27 DIAGNOSIS — Z71.84 TRAVEL ADVICE ENCOUNTER: Primary | ICD-10-CM

## 2025-08-27 DIAGNOSIS — J30.1 ALLERGIC RHINITIS DUE TO POLLEN, UNSPECIFIED SEASONALITY: ICD-10-CM

## 2025-08-27 DIAGNOSIS — M25.562 CHRONIC PAIN OF LEFT KNEE: ICD-10-CM

## 2025-08-27 LAB
ALBUMIN SERPL-MCNC: 4.3 G/DL (ref 3.5–5.2)
ALBUMIN UR-MCNC: <1.2 MG/DL
ALBUMIN/GLOB SERPL: 1.4 G/DL
ALP SERPL-CCNC: 20 U/L (ref 39–117)
ALT SERPL W P-5'-P-CCNC: 26 U/L (ref 1–41)
ANION GAP SERPL CALCULATED.3IONS-SCNC: 12 MMOL/L (ref 5–15)
AST SERPL-CCNC: 21 U/L (ref 1–40)
BASOPHILS # BLD AUTO: 0.04 10*3/MM3 (ref 0–0.2)
BASOPHILS NFR BLD AUTO: 0.5 % (ref 0–1.5)
BILIRUB SERPL-MCNC: 0.4 MG/DL (ref 0–1.2)
BUN SERPL-MCNC: 14 MG/DL (ref 6–20)
BUN/CREAT SERPL: 11.7 (ref 7–25)
CALCIUM SPEC-SCNC: 10.8 MG/DL (ref 8.6–10.5)
CHLORIDE SERPL-SCNC: 105 MMOL/L (ref 98–107)
CHOLEST SERPL-MCNC: 161 MG/DL (ref 0–200)
CO2 SERPL-SCNC: 24 MMOL/L (ref 22–29)
CREAT SERPL-MCNC: 1.2 MG/DL (ref 0.76–1.27)
CREAT UR-MCNC: 133.7 MG/DL
DEPRECATED RDW RBC AUTO: 40.6 FL (ref 37–54)
EGFRCR SERPLBLD CKD-EPI 2021: 80.4 ML/MIN/1.73
EOSINOPHIL # BLD AUTO: 0.11 10*3/MM3 (ref 0–0.4)
EOSINOPHIL NFR BLD AUTO: 1.4 % (ref 0.3–6.2)
ERYTHROCYTE [DISTWIDTH] IN BLOOD BY AUTOMATED COUNT: 14.1 % (ref 12.3–15.4)
GLOBULIN UR ELPH-MCNC: 3.1 GM/DL
GLUCOSE SERPL-MCNC: 98 MG/DL (ref 65–99)
HBA1C MFR BLD: 5.7 % (ref 4.8–5.6)
HCT VFR BLD AUTO: 43 % (ref 37.5–51)
HDLC SERPL-MCNC: 38 MG/DL (ref 40–60)
HGB BLD-MCNC: 13.9 G/DL (ref 13–17.7)
IMM GRANULOCYTES # BLD AUTO: 0.04 10*3/MM3 (ref 0–0.05)
IMM GRANULOCYTES NFR BLD AUTO: 0.5 % (ref 0–0.5)
LDLC SERPL CALC-MCNC: 103 MG/DL (ref 0–100)
LDLC/HDLC SERPL: 2.67 {RATIO}
LYMPHOCYTES # BLD AUTO: 2.57 10*3/MM3 (ref 0.7–3.1)
LYMPHOCYTES NFR BLD AUTO: 33.6 % (ref 19.6–45.3)
MCH RBC QN AUTO: 26.3 PG (ref 26.6–33)
MCHC RBC AUTO-ENTMCNC: 32.3 G/DL (ref 31.5–35.7)
MCV RBC AUTO: 81.3 FL (ref 79–97)
MICROALBUMIN/CREAT UR: NORMAL MG/G{CREAT}
MONOCYTES # BLD AUTO: 0.62 10*3/MM3 (ref 0.1–0.9)
MONOCYTES NFR BLD AUTO: 8.1 % (ref 5–12)
NEUTROPHILS NFR BLD AUTO: 4.26 10*3/MM3 (ref 1.7–7)
NEUTROPHILS NFR BLD AUTO: 55.9 % (ref 42.7–76)
NRBC BLD AUTO-RTO: 0 /100 WBC (ref 0–0.2)
PLATELET # BLD AUTO: 267 10*3/MM3 (ref 140–450)
PMV BLD AUTO: 9.4 FL (ref 6–12)
POTASSIUM SERPL-SCNC: 5 MMOL/L (ref 3.5–5.2)
PROT SERPL-MCNC: 7.4 G/DL (ref 6–8.5)
RBC # BLD AUTO: 5.29 10*6/MM3 (ref 4.14–5.8)
SODIUM SERPL-SCNC: 141 MMOL/L (ref 136–145)
TRIGL SERPL-MCNC: 107 MG/DL (ref 0–150)
VLDLC SERPL-MCNC: 20 MG/DL (ref 5–40)
WBC NRBC COR # BLD AUTO: 7.64 10*3/MM3 (ref 3.4–10.8)

## 2025-08-27 PROCEDURE — 36415 COLL VENOUS BLD VENIPUNCTURE: CPT

## 2025-08-27 PROCEDURE — 85025 COMPLETE CBC W/AUTO DIFF WBC: CPT

## 2025-08-27 PROCEDURE — 80061 LIPID PANEL: CPT

## 2025-08-27 PROCEDURE — 82043 UR ALBUMIN QUANTITATIVE: CPT

## 2025-08-27 PROCEDURE — 82570 ASSAY OF URINE CREATININE: CPT

## 2025-08-27 PROCEDURE — 80053 COMPREHEN METABOLIC PANEL: CPT

## 2025-08-27 PROCEDURE — 83036 HEMOGLOBIN GLYCOSYLATED A1C: CPT

## 2025-08-27 RX ORDER — METFORMIN HYDROCHLORIDE 500 MG/1
500 TABLET, EXTENDED RELEASE ORAL
Qty: 90 TABLET | Refills: 1 | Status: SHIPPED | OUTPATIENT
Start: 2025-08-27

## 2025-08-27 RX ORDER — OMEPRAZOLE 20 MG/1
20 CAPSULE, DELAYED RELEASE ORAL DAILY
Qty: 90 CAPSULE | Refills: 1 | Status: SHIPPED | OUTPATIENT
Start: 2025-08-27

## 2025-08-27 RX ORDER — LEVOCETIRIZINE DIHYDROCHLORIDE 5 MG/1
5 TABLET, FILM COATED ORAL DAILY
Qty: 90 TABLET | Refills: 1 | Status: SHIPPED | OUTPATIENT
Start: 2025-08-27

## 2025-08-27 RX ORDER — LISINOPRIL 10 MG/1
10 TABLET ORAL DAILY
Qty: 90 TABLET | Refills: 1 | Status: SHIPPED | OUTPATIENT
Start: 2025-08-27

## 2025-08-27 RX ORDER — SCOPOLAMINE 1 MG/3D
1 PATCH, EXTENDED RELEASE TRANSDERMAL
Qty: 4 EACH | Refills: 0 | Status: SHIPPED | OUTPATIENT
Start: 2025-08-27

## 2025-08-27 RX ORDER — ALUMINUM CHLORIDE 20 %
SOLUTION, NON-ORAL TOPICAL 2 TIMES DAILY
Qty: 35 ML | Refills: 5 | Status: SHIPPED | OUTPATIENT
Start: 2025-08-27

## (undated) DEVICE — GAMMEX® NON-LATEX SIZE 7.5, STERILE NEOPRENE POWDER-FREE SURGICAL GLOVE: Brand: GAMMEX

## (undated) DEVICE — VAGINAL PREP TRAY: Brand: MEDLINE INDUSTRIES, INC.

## (undated) DEVICE — SINGLE-USE BIOPSY FORCEPS: Brand: RADIAL JAW 4

## (undated) DEVICE — SOL IRR NACL 0.9PCT BT 1000ML

## (undated) DEVICE — DRP MICROSCP LECIA W/CLEARLENS 137X381CM

## (undated) DEVICE — INTENDED FOR TISSUE SEPARATION, AND OTHER PROCEDURES THAT REQUIRE A SHARP SURGICAL BLADE TO PUNCTURE OR CUT.: Brand: BARD-PARKER ® CARBON RIB-BACK BLADES

## (undated) DEVICE — STERILE POLYISOPRENE POWDER-FREE SURGICAL GLOVES WITH EMOLLIENT COATING: Brand: PROTEXIS

## (undated) DEVICE — SLV SCD KN/LEN ADJ EXPRSS BLENDED MD 1P/U

## (undated) DEVICE — GLV SURG BIOGEL LTX PF 6 1/2

## (undated) DEVICE — SUT VIC 0/0 UR6 27IN DYED J603H

## (undated) DEVICE — PENCL SMOKE/EVAC MEGADYNE TELESCP 10FT

## (undated) DEVICE — PENROSE DRAIN 12" X 1/4: Brand: CARDINAL HEALTH

## (undated) DEVICE — SOL IRRG H2O PL/BG 1000ML STRL

## (undated) DEVICE — ANTIBACTERIAL UNDYED BRAIDED (POLYGLACTIN 910), SYNTHETIC ABSORBABLE SUTURE: Brand: COATED VICRYL

## (undated) DEVICE — Device: Brand: DEFENDO AIR/WATER/SUCTION AND BIOPSY VALVE

## (undated) DEVICE — SUT GUT CHRM 2/0 SUTUPAK TIES 18IN SG13T

## (undated) DEVICE — SUT GUT CHRM 4/0 RB1 27IN U203H

## (undated) DEVICE — STERILE POLYISOPRENE POWDER-FREE SURGICAL GLOVES: Brand: PROTEXIS

## (undated) DEVICE — GLV SURG BIOGEL LTX PF 7 1/2

## (undated) DEVICE — LAMINECTOMY CERVICAL DISC-LF: Brand: MEDLINE INDUSTRIES, INC.

## (undated) DEVICE — ATHLETIC SUPPORTER LATEX FREE, XLARGE

## (undated) DEVICE — SUT GUT CHRM 3/0 SH 27IN G122H

## (undated) DEVICE — EGD OR ERCP KIT: Brand: MEDLINE INDUSTRIES, INC.

## (undated) DEVICE — PENCL E/S HNDSWCH ROCKR CB

## (undated) DEVICE — HEMOST ABS SURGIFOAM SZ12/7 2X6 7MM
Type: IMPLANTABLE DEVICE | Site: SPINE LUMBAR | Status: NON-FUNCTIONAL
Removed: 2022-10-21

## (undated) DEVICE — MAJOR-LF: Brand: MEDLINE INDUSTRIES, INC.